# Patient Record
Sex: MALE | Race: WHITE | Employment: OTHER | URBAN - NONMETROPOLITAN AREA
[De-identification: names, ages, dates, MRNs, and addresses within clinical notes are randomized per-mention and may not be internally consistent; named-entity substitution may affect disease eponyms.]

---

## 2019-11-20 ENCOUNTER — HOSPITAL ENCOUNTER (EMERGENCY)
Age: 59
Discharge: ANOTHER HOSPITAL | End: 2019-11-20
Attending: EMERGENCY MEDICINE
Payer: COMMERCIAL

## 2019-11-20 VITALS
DIASTOLIC BLOOD PRESSURE: 72 MMHG | TEMPERATURE: 97.4 F | WEIGHT: 149 LBS | SYSTOLIC BLOOD PRESSURE: 149 MMHG | RESPIRATION RATE: 18 BRPM | OXYGEN SATURATION: 98 % | HEART RATE: 86 BPM

## 2019-11-20 DIAGNOSIS — F20.9 SCHIZOPHRENIA, UNSPECIFIED TYPE (HCC): Primary | ICD-10-CM

## 2019-11-20 LAB
ABSOLUTE EOS #: 0.35 K/UL (ref 0–0.44)
ABSOLUTE IMMATURE GRANULOCYTE: <0.03 K/UL (ref 0–0.3)
ABSOLUTE LYMPH #: 2.5 K/UL (ref 1.1–3.7)
ABSOLUTE MONO #: 0.8 K/UL (ref 0.1–1.2)
ACETAMINOPHEN LEVEL: <5 UG/ML (ref 10–30)
AMPHETAMINE SCREEN URINE: NEGATIVE
ANION GAP SERPL CALCULATED.3IONS-SCNC: 10 MMOL/L (ref 9–17)
BARBITURATE SCREEN URINE: NEGATIVE
BASOPHILS # BLD: 2 % (ref 0–2)
BASOPHILS ABSOLUTE: 0.11 K/UL (ref 0–0.2)
BENZODIAZEPINE SCREEN, URINE: NEGATIVE
BUN BLDV-MCNC: 10 MG/DL (ref 6–20)
BUN/CREAT BLD: 10 (ref 9–20)
BUPRENORPHINE URINE: NEGATIVE
CALCIUM SERPL-MCNC: 9.1 MG/DL (ref 8.6–10.4)
CANNABINOID SCREEN URINE: NEGATIVE
CHLORIDE BLD-SCNC: 99 MMOL/L (ref 98–107)
CO2: 27 MMOL/L (ref 20–31)
COCAINE METABOLITE, URINE: NEGATIVE
CREAT SERPL-MCNC: 1.01 MG/DL (ref 0.7–1.2)
DIFFERENTIAL TYPE: ABNORMAL
EKG ATRIAL RATE: 78 BPM
EKG P AXIS: 76 DEGREES
EKG P-R INTERVAL: 170 MS
EKG Q-T INTERVAL: 368 MS
EKG QRS DURATION: 96 MS
EKG QTC CALCULATION (BAZETT): 419 MS
EKG R AXIS: 87 DEGREES
EKG T AXIS: 58 DEGREES
EKG VENTRICULAR RATE: 78 BPM
EOSINOPHILS RELATIVE PERCENT: 5 % (ref 1–4)
ETHANOL PERCENT: <0.01 %
ETHANOL: <10 MG/DL
GFR AFRICAN AMERICAN: >60 ML/MIN
GFR NON-AFRICAN AMERICAN: >60 ML/MIN
GFR SERPL CREATININE-BSD FRML MDRD: ABNORMAL ML/MIN/{1.73_M2}
GFR SERPL CREATININE-BSD FRML MDRD: ABNORMAL ML/MIN/{1.73_M2}
GLUCOSE BLD-MCNC: 107 MG/DL (ref 70–99)
HCT VFR BLD CALC: 44.1 % (ref 40.7–50.3)
HEMOGLOBIN: 14.4 G/DL (ref 13–17)
IMMATURE GRANULOCYTES: 0 %
LYMPHOCYTES # BLD: 37 % (ref 24–43)
MCH RBC QN AUTO: 29.4 PG (ref 25.2–33.5)
MCHC RBC AUTO-ENTMCNC: 32.7 G/DL (ref 28.4–34.8)
MCV RBC AUTO: 90 FL (ref 82.6–102.9)
MDMA URINE: ABNORMAL
METHADONE SCREEN, URINE: NEGATIVE
METHAMPHETAMINE, URINE: NEGATIVE
MONOCYTES # BLD: 12 % (ref 3–12)
NRBC AUTOMATED: 0 PER 100 WBC
OPIATES, URINE: NEGATIVE
OXYCODONE SCREEN URINE: NEGATIVE
PDW BLD-RTO: 12.8 % (ref 11.8–14.4)
PHENCYCLIDINE, URINE: NEGATIVE
PLATELET # BLD: 327 K/UL (ref 138–453)
PLATELET ESTIMATE: ABNORMAL
PMV BLD AUTO: 9.7 FL (ref 8.1–13.5)
POTASSIUM SERPL-SCNC: 4 MMOL/L (ref 3.7–5.3)
PROPOXYPHENE, URINE: NEGATIVE
RBC # BLD: 4.9 M/UL (ref 4.21–5.77)
RBC # BLD: ABNORMAL 10*6/UL
SALICYLATE LEVEL: <1 MG/DL (ref 3–10)
SEG NEUTROPHILS: 44 % (ref 36–65)
SEGMENTED NEUTROPHILS ABSOLUTE COUNT: 3.07 K/UL (ref 1.5–8.1)
SODIUM BLD-SCNC: 136 MMOL/L (ref 135–144)
TEST INFORMATION: ABNORMAL
TRICYCLIC ANTIDEPRESSANTS, UR: POSITIVE
WBC # BLD: 6.9 K/UL (ref 3.5–11.3)
WBC # BLD: ABNORMAL 10*3/UL

## 2019-11-20 PROCEDURE — 80306 DRUG TEST PRSMV INSTRMNT: CPT

## 2019-11-20 PROCEDURE — 80307 DRUG TEST PRSMV CHEM ANLYZR: CPT

## 2019-11-20 PROCEDURE — 93010 ELECTROCARDIOGRAM REPORT: CPT | Performed by: INTERNAL MEDICINE

## 2019-11-20 PROCEDURE — 93005 ELECTROCARDIOGRAM TRACING: CPT | Performed by: EMERGENCY MEDICINE

## 2019-11-20 PROCEDURE — 99284 EMERGENCY DEPT VISIT MOD MDM: CPT

## 2019-11-20 PROCEDURE — 36415 COLL VENOUS BLD VENIPUNCTURE: CPT

## 2019-11-20 PROCEDURE — 80048 BASIC METABOLIC PNL TOTAL CA: CPT

## 2019-11-20 PROCEDURE — G0480 DRUG TEST DEF 1-7 CLASSES: HCPCS

## 2019-11-20 PROCEDURE — 85025 COMPLETE CBC W/AUTO DIFF WBC: CPT

## 2019-11-20 RX ORDER — ARIPIPRAZOLE 10 MG/1
10 TABLET ORAL DAILY
Status: ON HOLD | COMMUNITY
End: 2021-01-28 | Stop reason: ALTCHOICE

## 2019-11-20 RX ORDER — QUETIAPINE 300 MG/1
100 TABLET, FILM COATED, EXTENDED RELEASE ORAL NIGHTLY
COMMUNITY

## 2019-11-20 RX ORDER — HYDROXYZINE HYDROCHLORIDE 25 MG/1
50 TABLET, FILM COATED ORAL 2 TIMES DAILY PRN
Status: ON HOLD | COMMUNITY
End: 2021-01-28 | Stop reason: ALTCHOICE

## 2019-11-20 RX ORDER — TRAZODONE HYDROCHLORIDE 100 MG/1
100 TABLET ORAL NIGHTLY
Status: ON HOLD | COMMUNITY
End: 2021-01-28 | Stop reason: ALTCHOICE

## 2019-11-20 RX ORDER — DIPHENHYDRAMINE HCL 25 MG
50 CAPSULE ORAL NIGHTLY
Status: ON HOLD | COMMUNITY
End: 2021-01-28 | Stop reason: ALTCHOICE

## 2019-11-20 RX ORDER — DIVALPROEX SODIUM 250 MG/1
750 TABLET, EXTENDED RELEASE ORAL NIGHTLY
COMMUNITY
End: 2021-02-25

## 2019-11-20 ASSESSMENT — ENCOUNTER SYMPTOMS
BACK PAIN: 1
EYE REDNESS: 0
VOMITING: 0
NAUSEA: 0
COUGH: 1
COLOR CHANGE: 0
EYE DISCHARGE: 0
SORE THROAT: 0
SHORTNESS OF BREATH: 0
ABDOMINAL PAIN: 0

## 2019-11-21 LAB
EKG ATRIAL RATE: 78 BPM
EKG P AXIS: 77 DEGREES
EKG P-R INTERVAL: 170 MS
EKG Q-T INTERVAL: 366 MS
EKG QRS DURATION: 96 MS
EKG QTC CALCULATION (BAZETT): 417 MS
EKG R AXIS: 87 DEGREES
EKG T AXIS: 59 DEGREES
EKG VENTRICULAR RATE: 78 BPM

## 2019-11-21 PROCEDURE — 93010 ELECTROCARDIOGRAM REPORT: CPT | Performed by: INTERNAL MEDICINE

## 2020-03-09 ENCOUNTER — HOSPITAL ENCOUNTER (EMERGENCY)
Age: 60
Discharge: HOME | End: 2020-03-09
Payer: COMMERCIAL

## 2020-03-09 VITALS
HEIGHT: 66 IN | HEART RATE: 85 BPM | RESPIRATION RATE: 18 BRPM | OXYGEN SATURATION: 99 % | WEIGHT: 149 LBS | SYSTOLIC BLOOD PRESSURE: 156 MMHG | TEMPERATURE: 97.7 F | DIASTOLIC BLOOD PRESSURE: 80 MMHG | BODY MASS INDEX: 23.95 KG/M2

## 2020-03-09 LAB
-: ABNORMAL
ABSOLUTE EOS #: 0.23 K/UL (ref 0–0.44)
ABSOLUTE IMMATURE GRANULOCYTE: <0.03 K/UL (ref 0–0.3)
ABSOLUTE LYMPH #: 1.81 K/UL (ref 1.1–3.7)
ABSOLUTE MONO #: 0.73 K/UL (ref 0.1–1.2)
ACETAMINOPHEN LEVEL: <5 UG/ML (ref 10–30)
ALBUMIN SERPL-MCNC: 3.8 G/DL (ref 3.5–5.2)
ALBUMIN/GLOBULIN RATIO: 1.1 (ref 1–2.5)
ALP BLD-CCNC: 67 U/L (ref 40–129)
ALT SERPL-CCNC: 17 U/L (ref 5–41)
AMORPHOUS: ABNORMAL
AMPHETAMINE SCREEN URINE: NEGATIVE
ANION GAP SERPL CALCULATED.3IONS-SCNC: 11 MMOL/L (ref 9–17)
AST SERPL-CCNC: 23 U/L
BACTERIA: ABNORMAL
BARBITURATE SCREEN URINE: NEGATIVE
BASOPHILS # BLD: 1 % (ref 0–2)
BASOPHILS ABSOLUTE: 0.07 K/UL (ref 0–0.2)
BENZODIAZEPINE SCREEN, URINE: NEGATIVE
BILIRUB SERPL-MCNC: 0.37 MG/DL (ref 0.3–1.2)
BILIRUBIN URINE: NEGATIVE
BUN BLDV-MCNC: 18 MG/DL (ref 6–20)
BUN/CREAT BLD: 23 (ref 9–20)
BUPRENORPHINE URINE: NEGATIVE
CALCIUM SERPL-MCNC: 8.9 MG/DL (ref 8.6–10.4)
CANNABINOID SCREEN URINE: NEGATIVE
CASTS UA: ABNORMAL /LPF
CHLORIDE BLD-SCNC: 106 MMOL/L (ref 98–107)
CO2: 24 MMOL/L (ref 20–31)
COCAINE METABOLITE, URINE: NEGATIVE
COLOR: YELLOW
COMMENT UA: ABNORMAL
CREAT SERPL-MCNC: 0.77 MG/DL (ref 0.7–1.2)
CRYSTALS, UA: ABNORMAL /HPF
DIFFERENTIAL TYPE: NORMAL
EOSINOPHILS RELATIVE PERCENT: 3 % (ref 1–4)
EPITHELIAL CELLS UA: ABNORMAL /HPF (ref 0–5)
ETHANOL PERCENT: <0.01 %
ETHANOL: <10 MG/DL
GFR AFRICAN AMERICAN: >60 ML/MIN
GFR NON-AFRICAN AMERICAN: >60 ML/MIN
GFR SERPL CREATININE-BSD FRML MDRD: ABNORMAL ML/MIN/{1.73_M2}
GFR SERPL CREATININE-BSD FRML MDRD: ABNORMAL ML/MIN/{1.73_M2}
GLUCOSE BLD-MCNC: 92 MG/DL (ref 70–99)
GLUCOSE URINE: NEGATIVE
HCT VFR BLD CALC: 41.1 % (ref 40.7–50.3)
HEMOGLOBIN: 13.1 G/DL (ref 13–17)
IMMATURE GRANULOCYTES: 0 %
KETONES, URINE: NEGATIVE
LEUKOCYTE ESTERASE, URINE: NEGATIVE
LYMPHOCYTES # BLD: 25 % (ref 24–43)
MCH RBC QN AUTO: 28.9 PG (ref 25.2–33.5)
MCHC RBC AUTO-ENTMCNC: 31.9 G/DL (ref 28.4–34.8)
MCV RBC AUTO: 90.7 FL (ref 82.6–102.9)
MDMA URINE: ABNORMAL
METHADONE SCREEN, URINE: NEGATIVE
METHAMPHETAMINE, URINE: NEGATIVE
MONOCYTES # BLD: 10 % (ref 3–12)
MUCUS: ABNORMAL
NITRITE, URINE: NEGATIVE
NRBC AUTOMATED: 0 PER 100 WBC
OPIATES, URINE: NEGATIVE
OTHER OBSERVATIONS UA: ABNORMAL
OXYCODONE SCREEN URINE: NEGATIVE
PDW BLD-RTO: 13.1 % (ref 11.8–14.4)
PH UA: 6 (ref 5–9)
PHENCYCLIDINE, URINE: NEGATIVE
PLATELET # BLD: 283 K/UL (ref 138–453)
PLATELET ESTIMATE: NORMAL
PMV BLD AUTO: 10.5 FL (ref 8.1–13.5)
POTASSIUM SERPL-SCNC: 4.2 MMOL/L (ref 3.7–5.3)
PROPOXYPHENE, URINE: NEGATIVE
PROTEIN UA: NEGATIVE
RBC # BLD: 4.53 M/UL (ref 4.21–5.77)
RBC # BLD: NORMAL 10*6/UL
RBC UA: ABNORMAL /HPF (ref 0–2)
RENAL EPITHELIAL, UA: ABNORMAL /HPF
SALICYLATE LEVEL: <1 MG/DL (ref 3–10)
SEG NEUTROPHILS: 61 % (ref 36–65)
SEGMENTED NEUTROPHILS ABSOLUTE COUNT: 4.54 K/UL (ref 1.5–8.1)
SODIUM BLD-SCNC: 141 MMOL/L (ref 135–144)
SPECIFIC GRAVITY UA: >1.03 (ref 1.01–1.02)
TEST INFORMATION: ABNORMAL
TOTAL PROTEIN: 7.3 G/DL (ref 6.4–8.3)
TRICHOMONAS: ABNORMAL
TRICYCLIC ANTIDEPRESSANTS, UR: POSITIVE
TURBIDITY: CLEAR
URINE HGB: ABNORMAL
UROBILINOGEN, URINE: NORMAL
WBC # BLD: 7.4 K/UL (ref 3.5–11.3)
WBC # BLD: NORMAL 10*3/UL
WBC UA: ABNORMAL /HPF (ref 0–5)
YEAST: ABNORMAL

## 2020-03-09 PROCEDURE — 36415 COLL VENOUS BLD VENIPUNCTURE: CPT

## 2020-03-09 PROCEDURE — 80053 COMPREHEN METABOLIC PANEL: CPT

## 2020-03-09 PROCEDURE — 99284 EMERGENCY DEPT VISIT MOD MDM: CPT

## 2020-03-09 PROCEDURE — 85025 COMPLETE CBC W/AUTO DIFF WBC: CPT

## 2020-03-09 PROCEDURE — 80306 DRUG TEST PRSMV INSTRMNT: CPT

## 2020-03-09 PROCEDURE — G0480 DRUG TEST DEF 1-7 CLASSES: HCPCS

## 2020-03-09 PROCEDURE — 81001 URINALYSIS AUTO W/SCOPE: CPT

## 2020-03-09 PROCEDURE — 80307 DRUG TEST PRSMV CHEM ANLYZR: CPT

## 2020-03-09 RX ORDER — QUETIAPINE FUMARATE 25 MG/1
50 TABLET, FILM COATED ORAL 2 TIMES DAILY
Status: ON HOLD | COMMUNITY
End: 2021-01-28 | Stop reason: ALTCHOICE

## 2020-03-09 NOTE — ED NOTES
Natalia called to check in that pt was physically in the ED. Stated they are sending a therapist over to evaluate pt.      BigBad  03/09/20 2546

## 2020-03-09 NOTE — ED PROVIDER NOTES
[facility] ED  EMERGENCY DEPARTMENT ENCOUNTER      Pt Name: Dorie Forman  MRN: [id number]  [place] 1960  Date of evaluation: 3/9/2020  Provider: Thai Ross PA-C    CHIEF COMPLAINT       Chief Complaint   Patient presents with   Darrell Garcia Other     Medical clearance. Pt sent from [place] after appointment today. Nurse practitioner at [place] reports pt had a change in personality/possible Psychosis today. Denies suicidal ideation       HISTORY OF PRESENT ILLNESS    Dorie Forman is a 61 y.o. male who presents to the emergency department from [facility] where he was seen today by a nurse practitioner who had seen him once before on Friday and thought that his personality changed today and was worried about psychosis. Patient denies any suicidal ideation, he presents with family members to bring him to the emergency department he states that he is acting at his baseline and they do not see anything different about him. He does have a history of schizophrenia and so his behavior may seem odd to some people but the family states they think that he is acting at his baseline. [facility] called and stated they were sending a counselor out to meet the patient in the emergency department after provided medical clearance. Patient denies feeling ill denies chest pain shortness of breath fevers chills cough sore throat abdominal pain nausea vomiting or any other such symptoms. Triage notes and Nursing notes were reviewed by myself. Any discrepancies are addressed above. PAST MEDICAL HISTORY     Past Medical History:   Diagnosis Date    BPH (benign prostatic hyperplasia)     Schizophrenia ([facility] [address]        SURGICAL HISTORY     History reviewed. No pertinent surgical history.     CURRENT MEDICATIONS       Discharge Medication List as of 3/9/2020  7:43 PM      CONTINUE these medications which have NOT CHANGED    Details   QUEtiapine (SEROQUEL) 25 MG tablet Take 50 mg by mouth 2 times dailyHistorical Med      ARIPiprazole ER (ABILIFY MAINTENA) 400 MG PRSY Inject 400 mg into the muscle once MonthsHistorical Med      divalproex (DEPAKOTE ER) 250 MG extended release tablet Take 750 mg by mouth nightlyHistorical Med      QUEtiapine (SEROQUEL XR) 300 MG extended release tablet Take 300 mg by mouth nightlyHistorical Med      ARIPiprazole (ABILIFY) 10 MG tablet Take 10 mg by mouth dailyHistorical Med      diphenhydrAMINE (BENADRYL) 25 MG capsule Take 50 mg by mouth nightlyHistorical Med      hydrOXYzine (ATARAX) 25 MG tablet Take 50 mg by mouth 2 times daily as needed for Anxiety 1-2 twice a day for anxietyHistorical Med      traZODone (DESYREL) 100 MG tablet Take 100 mg by mouth nightlyHistorical Med             ALLERGIES     Patient has no known allergies. FAMILY HISTORY     History reviewed. No pertinent family history.      SOCIAL HISTORY       Social History     Socioeconomic History    Marital status:      Spouse name: None    Number of children: None    Years of education: None    Highest education level: None   Occupational History    None   Social Needs    Financial resource strain: None    Food insecurity     Worry: None     Inability: None    Transportation needs     Medical: None     Non-medical: None   Tobacco Use    Smoking status: Current Every Day Smoker     Packs/day: 1.50     Years: 40.00     Pack years: 60.00     Types: Cigarettes    Smokeless tobacco: Never Used   Substance and Sexual Activity    Alcohol use: Not Currently    Drug use: Not Currently    Sexual activity: None   Lifestyle    Physical activity     Days per week: None     Minutes per session: None    Stress: None   Relationships    Social connections     Talks on phone: None     Gets together: None     Attends Anabaptist service: None     Active member of club or organization: None     Attends meetings of clubs or organizations: None     Relationship status: None    Intimate partner violence

## 2020-03-09 NOTE — ED NOTES
[facility] staff who arrived with pt states that the counselor has noticed a difference in pt since his appt 4 days ago. Pt denies suicidal ideation or thoughts of hurting others. Pt is accompanied by family. [facility] staff leaving at this time.      Mary Jane Bear RN  03/09/20 8056

## 2021-01-28 ENCOUNTER — APPOINTMENT (OUTPATIENT)
Dept: GENERAL RADIOLOGY | Age: 61
DRG: 101 | End: 2021-01-28
Attending: PSYCHIATRY & NEUROLOGY
Payer: COMMERCIAL

## 2021-01-28 ENCOUNTER — APPOINTMENT (OUTPATIENT)
Dept: MRI IMAGING | Age: 61
DRG: 101 | End: 2021-01-28
Attending: PSYCHIATRY & NEUROLOGY
Payer: COMMERCIAL

## 2021-01-28 ENCOUNTER — HOSPITAL ENCOUNTER (INPATIENT)
Age: 61
LOS: 4 days | Discharge: HOME | DRG: 101 | End: 2021-02-01
Attending: PSYCHIATRY & NEUROLOGY | Admitting: PSYCHIATRY & NEUROLOGY
Payer: COMMERCIAL

## 2021-01-28 DIAGNOSIS — G93.40 ENCEPHALOPATHY: Primary | ICD-10-CM

## 2021-01-28 PROBLEM — G40.901 STATUS EPILEPTICUS (HCC): Status: ACTIVE | Noted: 2021-01-28

## 2021-01-28 LAB
-: NORMAL
ABSOLUTE EOS #: 0.16 K/UL (ref 0–0.44)
ABSOLUTE IMMATURE GRANULOCYTE: 0.03 K/UL (ref 0–0.3)
ABSOLUTE LYMPH #: 2.44 K/UL (ref 1.1–3.7)
ABSOLUTE MONO #: 0.81 K/UL (ref 0.1–1.2)
ALBUMIN SERPL-MCNC: 2.9 G/DL (ref 3.5–5.2)
ALBUMIN SERPL-MCNC: NORMAL G/DL (ref 3.5–5.2)
ALBUMIN/GLOBULIN RATIO: 0.9 (ref 1–2.5)
ALBUMIN/GLOBULIN RATIO: NORMAL (ref 1–2.5)
ALP BLD-CCNC: 66 U/L (ref 40–129)
ALP BLD-CCNC: NORMAL U/L (ref 40–129)
ALT SERPL-CCNC: 13 U/L (ref 5–41)
ALT SERPL-CCNC: NORMAL U/L (ref 5–41)
AMMONIA: 42 UMOL/L (ref 16–60)
ANION GAP SERPL CALCULATED.3IONS-SCNC: 9 MMOL/L (ref 9–17)
AST SERPL-CCNC: 29 U/L
AST SERPL-CCNC: NORMAL U/L
BASOPHILS # BLD: 1 % (ref 0–2)
BASOPHILS ABSOLUTE: 0.08 K/UL (ref 0–0.2)
BILIRUB SERPL-MCNC: 0.49 MG/DL (ref 0.3–1.2)
BILIRUB SERPL-MCNC: NORMAL MG/DL (ref 0.3–1.2)
BILIRUBIN DIRECT: 0.09 MG/DL
BILIRUBIN DIRECT: NORMAL MG/DL
BILIRUBIN, INDIRECT: 0.4 MG/DL (ref 0–1)
BILIRUBIN, INDIRECT: NORMAL MG/DL (ref 0–1)
BUN BLDV-MCNC: 16 MG/DL (ref 8–23)
BUN/CREAT BLD: ABNORMAL (ref 9–20)
CALCIUM SERPL-MCNC: 8.4 MG/DL (ref 8.6–10.4)
CHLORIDE BLD-SCNC: 115 MMOL/L (ref 98–107)
CO2: 18 MMOL/L (ref 20–31)
CREAT SERPL-MCNC: 0.8 MG/DL (ref 0.7–1.2)
DIFFERENTIAL TYPE: ABNORMAL
EOSINOPHILS RELATIVE PERCENT: 2 % (ref 1–4)
GFR AFRICAN AMERICAN: >60 ML/MIN
GFR NON-AFRICAN AMERICAN: >60 ML/MIN
GFR SERPL CREATININE-BSD FRML MDRD: ABNORMAL ML/MIN/{1.73_M2}
GFR SERPL CREATININE-BSD FRML MDRD: ABNORMAL ML/MIN/{1.73_M2}
GLOBULIN: ABNORMAL G/DL (ref 1.5–3.8)
GLOBULIN: NORMAL G/DL (ref 1.5–3.8)
GLUCOSE BLD-MCNC: 75 MG/DL (ref 70–99)
HCT VFR BLD CALC: 43.6 % (ref 40.7–50.3)
HEMOGLOBIN: 12.6 G/DL (ref 13–17)
IMMATURE GRANULOCYTES: 0 %
INR BLD: 1
LYMPHOCYTES # BLD: 23 % (ref 24–43)
MAGNESIUM: 2.5 MG/DL (ref 1.6–2.6)
MCH RBC QN AUTO: 28.4 PG (ref 25.2–33.5)
MCHC RBC AUTO-ENTMCNC: 28.9 G/DL (ref 28.4–34.8)
MCV RBC AUTO: 98.4 FL (ref 82.6–102.9)
MONOCYTES # BLD: 8 % (ref 3–12)
NRBC AUTOMATED: 0 PER 100 WBC
PARTIAL THROMBOPLASTIN TIME: 23.7 SEC (ref 20.5–30.5)
PDW BLD-RTO: 13.8 % (ref 11.8–14.4)
PLATELET # BLD: 164 K/UL (ref 138–453)
PLATELET ESTIMATE: ABNORMAL
PMV BLD AUTO: 11.2 FL (ref 8.1–13.5)
POTASSIUM SERPL-SCNC: 4.2 MMOL/L (ref 3.7–5.3)
PROCALCITONIN: 0.15 NG/ML
PROTHROMBIN TIME: 11 SEC (ref 9–12)
RBC # BLD: 4.43 M/UL (ref 4.21–5.77)
RBC # BLD: ABNORMAL 10*6/UL
REASON FOR REJECTION: NORMAL
SEG NEUTROPHILS: 66 % (ref 36–65)
SEGMENTED NEUTROPHILS ABSOLUTE COUNT: 7.07 K/UL (ref 1.5–8.1)
SODIUM BLD-SCNC: 142 MMOL/L (ref 135–144)
TOTAL PROTEIN: 6 G/DL (ref 6.4–8.3)
TOTAL PROTEIN: NORMAL G/DL (ref 6.4–8.3)
VALPROIC ACID % FREE: 19 % (ref 5–18.4)
VALPROIC ACID LEVEL: 72 UG/ML (ref 50–125)
VALPROIC ACID LEVEL: 85 UG/ML (ref 50–125)
VALPROIC ACID, FREE: 13.7 UG/ML (ref 7–23)
VALPROIC DATE LAST DOSE: ABNORMAL
VALPROIC DATE LAST DOSE: NORMAL
VALPROIC DOSE AMOUNT: ABNORMAL
VALPROIC DOSE AMOUNT: NORMAL
VALPROIC TIME LAST DOSE: ABNORMAL
VALPROIC TIME LAST DOSE: NORMAL
WBC # BLD: 10.6 K/UL (ref 3.5–11.3)
WBC # BLD: ABNORMAL 10*3/UL
ZZ NTE CLEAN UP: ORDERED TEST: NORMAL
ZZ NTE WITH NAME CLEAN UP: SPECIMEN SOURCE: NORMAL

## 2021-01-28 PROCEDURE — 87086 URINE CULTURE/COLONY COUNT: CPT

## 2021-01-28 PROCEDURE — 70553 MRI BRAIN STEM W/O & W/DYE: CPT

## 2021-01-28 PROCEDURE — 80164 ASSAY DIPROPYLACETIC ACD TOT: CPT

## 2021-01-28 PROCEDURE — 95700 EEG CONT REC W/VID EEG TECH: CPT

## 2021-01-28 PROCEDURE — 51798 US URINE CAPACITY MEASURE: CPT

## 2021-01-28 PROCEDURE — 82140 ASSAY OF AMMONIA: CPT

## 2021-01-28 PROCEDURE — 2580000003 HC RX 258: Performed by: STUDENT IN AN ORGANIZED HEALTH CARE EDUCATION/TRAINING PROGRAM

## 2021-01-28 PROCEDURE — 71045 X-RAY EXAM CHEST 1 VIEW: CPT

## 2021-01-28 PROCEDURE — 85610 PROTHROMBIN TIME: CPT

## 2021-01-28 PROCEDURE — 85025 COMPLETE CBC W/AUTO DIFF WBC: CPT

## 2021-01-28 PROCEDURE — 94002 VENT MGMT INPAT INIT DAY: CPT

## 2021-01-28 PROCEDURE — 2000000003 HC NEURO ICU R&B

## 2021-01-28 PROCEDURE — 99291 CRITICAL CARE FIRST HOUR: CPT | Performed by: PSYCHIATRY & NEUROLOGY

## 2021-01-28 PROCEDURE — 80076 HEPATIC FUNCTION PANEL: CPT

## 2021-01-28 PROCEDURE — 80165 DIPROPYLACETIC ACID FREE: CPT

## 2021-01-28 PROCEDURE — 5A1945Z RESPIRATORY VENTILATION, 24-96 CONSECUTIVE HOURS: ICD-10-PCS | Performed by: PSYCHIATRY & NEUROLOGY

## 2021-01-28 PROCEDURE — A9576 INJ PROHANCE MULTIPACK: HCPCS | Performed by: PSYCHIATRY & NEUROLOGY

## 2021-01-28 PROCEDURE — 84145 PROCALCITONIN (PCT): CPT

## 2021-01-28 PROCEDURE — 6370000000 HC RX 637 (ALT 250 FOR IP): Performed by: STUDENT IN AN ORGANIZED HEALTH CARE EDUCATION/TRAINING PROGRAM

## 2021-01-28 PROCEDURE — 83735 ASSAY OF MAGNESIUM: CPT

## 2021-01-28 PROCEDURE — 81001 URINALYSIS AUTO W/SCOPE: CPT

## 2021-01-28 PROCEDURE — 6370000000 HC RX 637 (ALT 250 FOR IP): Performed by: NURSE PRACTITIONER

## 2021-01-28 PROCEDURE — 36415 COLL VENOUS BLD VENIPUNCTURE: CPT

## 2021-01-28 PROCEDURE — 6360000002 HC RX W HCPCS: Performed by: NURSE PRACTITIONER

## 2021-01-28 PROCEDURE — 80048 BASIC METABOLIC PNL TOTAL CA: CPT

## 2021-01-28 PROCEDURE — 80307 DRUG TEST PRSMV CHEM ANLYZR: CPT

## 2021-01-28 PROCEDURE — 95714 VEEG EA 12-26 HR UNMNTR: CPT

## 2021-01-28 PROCEDURE — 85730 THROMBOPLASTIN TIME PARTIAL: CPT

## 2021-01-28 PROCEDURE — 6360000004 HC RX CONTRAST MEDICATION: Performed by: PSYCHIATRY & NEUROLOGY

## 2021-01-28 PROCEDURE — 94003 VENT MGMT INPAT SUBQ DAY: CPT

## 2021-01-28 RX ORDER — VALPROIC ACID 250 MG/5ML
500 SOLUTION ORAL EVERY 12 HOURS SCHEDULED
Status: DISCONTINUED | OUTPATIENT
Start: 2021-01-28 | End: 2021-01-31

## 2021-01-28 RX ORDER — PROMETHAZINE HYDROCHLORIDE 12.5 MG/1
12.5 TABLET ORAL EVERY 6 HOURS PRN
Status: DISCONTINUED | OUTPATIENT
Start: 2021-01-28 | End: 2021-02-01 | Stop reason: HOSPADM

## 2021-01-28 RX ORDER — ONDANSETRON 2 MG/ML
4 INJECTION INTRAMUSCULAR; INTRAVENOUS EVERY 6 HOURS PRN
Status: DISCONTINUED | OUTPATIENT
Start: 2021-01-28 | End: 2021-02-01 | Stop reason: HOSPADM

## 2021-01-28 RX ORDER — FAMOTIDINE 20 MG/1
20 TABLET, FILM COATED ORAL 2 TIMES DAILY
Status: DISCONTINUED | OUTPATIENT
Start: 2021-01-29 | End: 2021-01-30

## 2021-01-28 RX ORDER — 0.9 % SODIUM CHLORIDE 0.9 %
1000 INTRAVENOUS SOLUTION INTRAVENOUS ONCE
Status: COMPLETED | OUTPATIENT
Start: 2021-01-28 | End: 2021-01-28

## 2021-01-28 RX ORDER — SODIUM CHLORIDE 0.9 % (FLUSH) 0.9 %
10 SYRINGE (ML) INJECTION PRN
Status: DISCONTINUED | OUTPATIENT
Start: 2021-01-28 | End: 2021-02-01 | Stop reason: HOSPADM

## 2021-01-28 RX ORDER — DIVALPROEX SODIUM 500 MG/1
500 TABLET, EXTENDED RELEASE ORAL 2 TIMES DAILY
Status: DISCONTINUED | OUTPATIENT
Start: 2021-01-28 | End: 2021-01-28

## 2021-01-28 RX ORDER — PROPOFOL 10 MG/ML
10 INJECTION, EMULSION INTRAVENOUS
Status: DISCONTINUED | OUTPATIENT
Start: 2021-01-28 | End: 2021-01-29

## 2021-01-28 RX ORDER — SODIUM CHLORIDE 9 MG/ML
INJECTION, SOLUTION INTRAVENOUS CONTINUOUS
Status: DISCONTINUED | OUTPATIENT
Start: 2021-01-28 | End: 2021-01-29

## 2021-01-28 RX ORDER — SODIUM CHLORIDE 0.9 % (FLUSH) 0.9 %
10 SYRINGE (ML) INJECTION EVERY 12 HOURS SCHEDULED
Status: DISCONTINUED | OUTPATIENT
Start: 2021-01-28 | End: 2021-02-01 | Stop reason: HOSPADM

## 2021-01-28 RX ORDER — ACETAMINOPHEN 325 MG/1
650 TABLET ORAL EVERY 6 HOURS PRN
Status: DISCONTINUED | OUTPATIENT
Start: 2021-01-28 | End: 2021-02-01 | Stop reason: HOSPADM

## 2021-01-28 RX ORDER — ARIPIPRAZOLE 10 MG/1
10 TABLET ORAL DAILY
Status: DISCONTINUED | OUTPATIENT
Start: 2021-01-28 | End: 2021-02-01 | Stop reason: HOSPADM

## 2021-01-28 RX ORDER — QUETIAPINE FUMARATE 25 MG/1
50 TABLET, FILM COATED ORAL 2 TIMES DAILY
Status: DISCONTINUED | OUTPATIENT
Start: 2021-01-28 | End: 2021-01-29

## 2021-01-28 RX ORDER — ACETAMINOPHEN 650 MG/1
650 SUPPOSITORY RECTAL EVERY 6 HOURS PRN
Status: DISCONTINUED | OUTPATIENT
Start: 2021-01-28 | End: 2021-02-01 | Stop reason: HOSPADM

## 2021-01-28 RX ORDER — POLYETHYLENE GLYCOL 3350 17 G/17G
17 POWDER, FOR SOLUTION ORAL DAILY PRN
Status: DISCONTINUED | OUTPATIENT
Start: 2021-01-28 | End: 2021-02-01 | Stop reason: HOSPADM

## 2021-01-28 RX ADMIN — SODIUM CHLORIDE, PRESERVATIVE FREE 10 ML: 5 INJECTION INTRAVENOUS at 20:48

## 2021-01-28 RX ADMIN — GADOTERIDOL 14 ML: 279.3 INJECTION, SOLUTION INTRAVENOUS at 16:51

## 2021-01-28 RX ADMIN — PROPOFOL 7.5 MCG/KG/MIN: 10 INJECTION, EMULSION INTRAVENOUS at 16:00

## 2021-01-28 RX ADMIN — VALPROIC ACID 500 MG: 250 SOLUTION ORAL at 11:18

## 2021-01-28 RX ADMIN — SODIUM CHLORIDE: 9 INJECTION, SOLUTION INTRAVENOUS at 08:15

## 2021-01-28 RX ADMIN — PROPOFOL 10 MCG/KG/MIN: 10 INJECTION, EMULSION INTRAVENOUS at 09:45

## 2021-01-28 RX ADMIN — VALPROIC ACID 500 MG: 250 SOLUTION ORAL at 20:48

## 2021-01-28 RX ADMIN — SODIUM CHLORIDE 1000 ML: 9 INJECTION, SOLUTION INTRAVENOUS at 06:46

## 2021-01-28 RX ADMIN — QUETIAPINE FUMARATE 50 MG: 25 TABLET ORAL at 20:48

## 2021-01-28 ASSESSMENT — PULMONARY FUNCTION TESTS
PIF_VALUE: 21
PIF_VALUE: 41
PIF_VALUE: 24
PIF_VALUE: 26
PIF_VALUE: 22

## 2021-01-28 NOTE — H&P
Neuro ICU History & Physical    Patient Name: Andrew Roblero  Patient : 1960  Room/Bed: [facility]05-  Code Status: Full  Allergies: No Known Allergies    CHIEF COMPLAINT     Seizures    HPI    History Obtained From: EMR    The patient is a 61 y.o. male with history of schizoaffective disorder on Depakote, presented with seizures. Patient was found unresponsive by wife, called EMS, found to have generalized tonic-clonic seizures outlying facility, intubated, sent to [facility] for further evaluation. Patient has no history of seizures, is on Depakote for schizoaffective disorder. Patient was found to be positive for cocaine on urine drug screen. CT head was unremarkable. Patient was loaded with Keppra, Depakote. There was also concern for pneumonia, patient was started on vancomycin and Zosyn. On exam, patient is intubated and sedated, withdrawing to pain    Admitted to ICU From: [facility]  Reason for ICU Admission: Seizures       PATIENT HISTORY   Past Medical History:        Diagnosis Date    BPH (benign prostatic hyperplasia)     Schizophrenia ([facility] [address]        Past Surgical History:    No past surgical history on file.     Social History:   Social History     Socioeconomic History    Marital status:      Spouse name: Not on file    Number of children: Not on file    Years of education: Not on file    Highest education level: Not on file   Occupational History    Not on file   Social Needs    Financial resource strain: Not on file    Food insecurity     Worry: Not on file     Inability: Not on file   [employer] needs     Medical: Not on file     Non-medical: Not on file   Tobacco Use    Smoking status: Current Every Day Smoker     Packs/day: 1.50     Years: 40.00     Pack years: 60.00     Types: Cigarettes    Smokeless tobacco: Never Used   Substance and Sexual Activity    Alcohol use: Not Currently    Drug use: Not Currently    Sexual activity: Not on file   Lifestyle  Physical activity     Days per week: Not on file     Minutes per session: Not on file    Stress: Not on file   Relationships    Social connections     Talks on phone: Not on file     Gets together: Not on file     Attends Pentecostalism service: Not on file     Active member of club or organization: Not on file     Attends meetings of clubs or organizations: Not on file     Relationship status: Not on file    Intimate partner violence     Fear of current or ex partner: Not on file     Emotionally abused: Not on file     Physically abused: Not on file     Forced sexual activity: Not on file   Other Topics Concern    Not on file   Social History Narrative    Not on file       Family History:   No family history on file. Allergies:    Patient has no known allergies. Medications Prior to Admission:    Medications Prior to Admission: QUEtiapine (SEROQUEL) 25 MG tablet, Take 50 mg by mouth 2 times daily  ARIPiprazole (ABILIFY) 10 MG tablet, Take 10 mg by mouth daily  ARIPiprazole ER (ABILIFY MAINTENA) 400 MG PRSY, Inject 400 mg into the muscle once Months  divalproex (DEPAKOTE ER) 250 MG extended release tablet, Take 750 mg by mouth nightly  diphenhydrAMINE (BENADRYL) 25 MG capsule, Take 50 mg by mouth nightly  hydrOXYzine (ATARAX) 25 MG tablet, Take 50 mg by mouth 2 times daily as needed for Anxiety 1-2 twice a day for anxiety  QUEtiapine (SEROQUEL XR) 300 MG extended release tablet, Take 300 mg by mouth nightly  traZODone (DESYREL) 100 MG tablet, Take 100 mg by mouth nightly    Current Medications:  No current facility-administered medications for this encounter.      REVIEW OF SYSTEMS     Unable to obtain secondary to condition    PHYSICAL EXAM:     Ht 5' 6\" (1.676 m)   Wt 159 lb 9.8 oz (72.4 kg)   BMI 25.76 kg/m²     PHYSICAL EXAM:  CONSTITUTIONAL:  Patient is intubated and sedated   HEAD:  normocephalic, atraumatic    EYES:  PERRLA, EOMI.   ENT:  moist mucous membranes   LUNGS:  Equal air entry bilaterally CARDIOVASCULAR:  normal s1 / s2   ABDOMEN:  Soft, no rigidity   NECK supple, symmetric, no midline tenderness to palpation    BACK without midline tenderness, step-offs or deformities    EXTREMITIES Normal ROM with no deformities   NEUROLOGIC:  Mental Status: Intubated and sedated             Cranial Nerves:    Pupils 3 mm and equally reactive bilaterally  Cough reflex intact    Motor Exam:    Withdraws to pain in all extremities     SKIN No obvious ecchymosis, rashes, or lesions          LABS AND IMAGING:     RECENT LABS:  CBC with Differential:    Lab Results   Component Value Date    WBC 7.4 03/09/2020    RBC 4.53 03/09/2020    HGB 13.1 03/09/2020    HCT 41.1 03/09/2020     03/09/2020    MCV 90.7 03/09/2020    MCH 28.9 03/09/2020    MCHC 31.9 03/09/2020    RDW 13.1 03/09/2020    LYMPHOPCT 25 03/09/2020    MONOPCT 10 03/09/2020    BASOPCT 1 03/09/2020    MONOSABS 0.73 03/09/2020    LYMPHSABS 1.81 03/09/2020    EOSABS 0.23 03/09/2020    BASOSABS 0.07 03/09/2020    DIFFTYPE NOT REPORTED 03/09/2020     BMP:    Lab Results   Component Value Date     03/09/2020    K 4.2 03/09/2020     03/09/2020    CO2 24 03/09/2020    BUN 18 03/09/2020    LABALBU 3.8 03/09/2020    CREATININE 0.77 03/09/2020    CALCIUM 8.9 03/09/2020    GFRAA >60 03/09/2020    LABGLOM >60 03/09/2020    GLUCOSE 92 03/09/2020       RADIOLOGY:     No results found. Labs and Images reviewed with:    [] Richie Suarez MD    [x] Chet Gomez MD  [] Swapnil Sparks MD  --[] there are no new interval images to review. ASSESSMENT AND PLAN:         ASSESSMENT:     This is a 61 y.o. male with history of schizoaffective disorder on Depakote, presented for first-time seizure, intubated and sedated on propofol. Patient care will be discussed with attending, will reevaluate patient along with attending.      PLAN/MEDICAL DECISION MAKING:      NEUROLOGIC:  - Imaging CT head at outlying facility found no acute intracranial process, was significant for mild old microvascular ischemic change and age-related cerebral atrophy  - AEDs loaded with Keppra and Depakote   Continue Depakote 500 mg twice daily  - LP this morning  - Goal SBP normotensive  - Continue home psychiatric medications  - Neuro checks per protocol    CARDIOVASCULAR:  - Goal SBP normotensive  - Continue telemetry    PULMONARY:  - Vent Settings: 16/530/40 percent/5 PEEP  - Daily ABG: Pending  - Wean from sedation and extubate    RENAL/FLUID/ELECTROLYTE:  - Labs pending  - Monitor urine output  - IVF: 100 cc/h normal saline  - Replace electrolytes PRN  - Daily BMP    GI/NUTRITION:  NUTRITION:  No diet orders on file  - Bowel regimen: GlycoLax as needed  - GI prophylaxis: Pepcid twice daily    ID:  - Afebrile  - Continue to monitor for fevers  - Daily CBC    HEME:   - Labs pending  - Daily CBC    ENDOCRINE:  - Continue to monitor blood glucose, goal <180    OTHER:  - PT/OT/ST     PROPHYLAXIS:  Stress ulcer: H2 blocker    DVT PROPHYLAXIS:  - SCD sleeves - Thigh High   - ANY stockings - Thigh High  -Lovenox      DISPOSITION: Admit to ICU      Kelly Ernst MD  Neuro Critical Care Service   Pager [phone]  1/28/2021     5:53 AM

## 2021-01-28 NOTE — PLAN OF CARE
Pt vented and sedated. NS and propofol infusing. Restraints in place. MRI done. Continuous EEG. Urology to be consulted. No additional needs at this time, will continue to monitor. Wife Roberto Lerner at the bedside and updated.     Problem: Restraint Use - Nonviolent/Non-Self-Destructive Behavior:  Goal: Absence of restraint indications  Description: Absence of restraint indications  1/28/2021 1842 by Darin Hylton RN  Outcome: Ongoing  1/28/2021 0702 by Gretchen Alexandra RN  Outcome: Not Met This Shift  Goal: Absence of restraint-related injury  Description: Absence of restraint-related injury  1/28/2021 1842 by Darin Hylton RN  Outcome: Ongoing  1/28/2021 0702 by Gretchen Alexandra RN  Outcome: Met This Shift     Problem: OXYGENATION/RESPIRATORY FUNCTION  Goal: Patient will achieve/maintain normal respiratory rate/effort  Description: Respiratory rate and effort will be within normal limits for the patient  1/28/2021 1842 by Darin Hylton RN  Outcome: Ongoing  1/28/2021 1644 by Nemesio Mcqueen RCP  Outcome: Ongoing     Problem: MECHANICAL VENTILATION  Goal: Patient will maintain patent airway  1/28/2021 1842 by Darin Hylton RN  Outcome: Ongoing  1/28/2021 1644 by Nemesio Mcqueen RCP  Outcome: Ongoing  Goal: Oral health is maintained or improved  1/28/2021 1842 by Darin Hylton RN  Outcome: Ongoing  1/28/2021 1644 by Nemesio Mcqueen RCP  Outcome: Ongoing  Goal: ET tube will be managed safely  1/28/2021 1842 by Darin Hylton RN  Outcome: Ongoing  1/28/2021 1644 by Nemesio Mcqueen RCP  Outcome: Ongoing  Goal: Ability to express needs and understand communication  1/28/2021 1842 by Darin Hylton RN  Outcome: Ongoing  1/28/2021 1644 by Nemesio Mcqueen RCP  Outcome: Ongoing  Goal: Mobility/activity is maintained at optimum level for patient  1/28/2021 1842 by Darin Hylton RN  Outcome: Ongoing  1/28/2021 1644 by Nemesio Mcqueen RCP  Outcome: Ongoing

## 2021-01-29 LAB
-: ABNORMAL
ABSOLUTE EOS #: 0.35 K/UL (ref 0–0.44)
ABSOLUTE IMMATURE GRANULOCYTE: <0.03 K/UL (ref 0–0.3)
ABSOLUTE LYMPH #: 1.68 K/UL (ref 1.1–3.7)
ABSOLUTE MONO #: 0.53 K/UL (ref 0.1–1.2)
ALBUMIN CSF: 839 MG/L (ref 70–350)
ALBUMIN INDEX: 262.2
ALBUMIN SERPL-MCNC: 3.2 G/DL (ref 3.5–5.2)
ALLEN TEST: ABNORMAL
AMORPHOUS: ABNORMAL
AMPHETAMINE SCREEN URINE: NEGATIVE
ANION GAP SERPL CALCULATED.3IONS-SCNC: 7 MMOL/L (ref 9–17)
APPEARANCE CSF: CLEAR
BACTERIA: ABNORMAL
BARBITURATE SCREEN URINE: NEGATIVE
BASOPHILS # BLD: 1 % (ref 0–2)
BASOPHILS ABSOLUTE: 0.06 K/UL (ref 0–0.2)
BENZODIAZEPINE SCREEN, URINE: POSITIVE
BILIRUBIN URINE: ABNORMAL
BUN BLDV-MCNC: 18 MG/DL (ref 8–23)
BUN/CREAT BLD: ABNORMAL (ref 9–20)
BUPRENORPHINE URINE: ABNORMAL
CALCIUM SERPL-MCNC: 8.1 MG/DL (ref 8.6–10.4)
CANNABINOID SCREEN URINE: NEGATIVE
CASTS UA: ABNORMAL /LPF (ref 0–2)
CHLORIDE BLD-SCNC: 114 MMOL/L (ref 98–107)
CO2: 21 MMOL/L (ref 20–31)
COCAINE METABOLITE, URINE: NEGATIVE
COLOR: ABNORMAL
CREAT SERPL-MCNC: 0.77 MG/DL (ref 0.7–1.2)
CRYPTOCOCCUS NEOFORMANS/GATTI CSF FILM ARR.: NOT DETECTED
CRYSTALS, UA: ABNORMAL /HPF
CULTURE: NO GROWTH
CYTOMEGALOVIRUS (CMV) CSF FILM ARRAY: NOT DETECTED
DIFFERENTIAL TYPE: ABNORMAL
DIRECT EXAM: NORMAL
ENTEROVIRUS CSF FILM ARRAY: NOT DETECTED
EOSINOPHILS RELATIVE PERCENT: 5 % (ref 1–4)
EPITHELIAL CELLS UA: ABNORMAL /HPF (ref 0–5)
ESCHERICHIA COLI K1 CSF FILM ARRAY: NOT DETECTED
FIO2: 40
GFR AFRICAN AMERICAN: >60 ML/MIN
GFR NON-AFRICAN AMERICAN: >60 ML/MIN
GFR SERPL CREATININE-BSD FRML MDRD: ABNORMAL ML/MIN/{1.73_M2}
GFR SERPL CREATININE-BSD FRML MDRD: ABNORMAL ML/MIN/{1.73_M2}
GLUCOSE BLD-MCNC: 84 MG/DL (ref 74–100)
GLUCOSE BLD-MCNC: 85 MG/DL (ref 70–99)
GLUCOSE URINE: ABNORMAL
GLUCOSE, CSF: 49 MG/DL (ref 40–70)
HAEMOPHILUS INFLUENZA CSF FILM ARRAY: NOT DETECTED
HCT VFR BLD CALC: 37.5 % (ref 40.7–50.3)
HEMOGLOBIN: 11.5 G/DL (ref 13–17)
HHV-6 (HERPESVIRUS 6) CSF FILM ARRAY: NOT DETECTED
HSV-1 CSF FILM ARRAY: NOT DETECTED
HSV-2 CSF FILM ARRAY: NOT DETECTED
IGG CSF: 22.9 MG/DL (ref 0.5–7)
IGG INDEX CSF: 0.7
IGG SYNTHESIS RATE CSF: 39.3 MG/24 H
IGG: 1242 MG/DL (ref 700–1600)
IMMATURE GRANULOCYTES: 0 %
KETONES, URINE: ABNORMAL
LEUKOCYTE ESTERASE, URINE: ABNORMAL
LISTERIA MONOCYTOGENES CSF FILM ARRAY: NOT DETECTED
LYMPHOCYTES # BLD: 23 % (ref 24–43)
Lab: NORMAL
Lab: NORMAL
MAGNESIUM: 1.9 MG/DL (ref 1.6–2.6)
MCH RBC QN AUTO: 28.2 PG (ref 25.2–33.5)
MCHC RBC AUTO-ENTMCNC: 30.7 G/DL (ref 28.4–34.8)
MCV RBC AUTO: 91.9 FL (ref 82.6–102.9)
MDMA URINE: ABNORMAL
METHADONE SCREEN, URINE: NEGATIVE
METHAMPHETAMINE, URINE: ABNORMAL
MODE: ABNORMAL
MONOCYTES # BLD: 7 % (ref 3–12)
MUCUS: ABNORMAL
NEGATIVE BASE EXCESS, ART: 1 (ref 0–2)
NEISSERIA MENIGITIDIS CSF FILM ARRAY: NOT DETECTED
NITRITE, URINE: POSITIVE
NRBC AUTOMATED: 0 PER 100 WBC
O2 DEVICE/FLOW/%: ABNORMAL
OLIGOCLONAL BANDS: ABNORMAL
OPIATES, URINE: NEGATIVE
OTHER OBSERVATIONS UA: ABNORMAL
OXYCODONE SCREEN URINE: NEGATIVE
PARECHOVIRUS CSF FILM ARRAY: NOT DETECTED
PATIENT TEMP: ABNORMAL
PDW BLD-RTO: 13.8 % (ref 11.8–14.4)
PH UA: 6.5 (ref 5–8)
PHENCYCLIDINE, URINE: NEGATIVE
PLATELET # BLD: 206 K/UL (ref 138–453)
PLATELET ESTIMATE: ABNORMAL
PMV BLD AUTO: 10.9 FL (ref 8.1–13.5)
POC HCO3: 24.5 MMOL/L (ref 21–28)
POC LACTIC ACID: 0.53 MMOL/L (ref 0.56–1.39)
POC O2 SATURATION: 99 % (ref 94–98)
POC PCO2 TEMP: ABNORMAL MM HG
POC PCO2: 45.1 MM HG (ref 35–48)
POC PH TEMP: ABNORMAL
POC PH: 7.34 (ref 7.35–7.45)
POC PO2 TEMP: ABNORMAL MM HG
POC PO2: 142 MM HG (ref 83–108)
POSITIVE BASE EXCESS, ART: ABNORMAL (ref 0–3)
POTASSIUM SERPL-SCNC: 3.8 MMOL/L (ref 3.7–5.3)
PROPOXYPHENE, URINE: ABNORMAL
PROTEIN CSF: 138.8 MG/DL (ref 15–45)
PROTEIN UA: ABNORMAL
RBC # BLD: 4.08 M/UL (ref 4.21–5.77)
RBC # BLD: ABNORMAL 10*6/UL
RBC CSF: 0 /MM3
RBC UA: ABNORMAL /HPF (ref 0–2)
RENAL EPITHELIAL, UA: ABNORMAL /HPF
SAMPLE SITE: ABNORMAL
SEG NEUTROPHILS: 64 % (ref 36–65)
SEGMENTED NEUTROPHILS ABSOLUTE COUNT: 4.59 K/UL (ref 1.5–8.1)
SODIUM BLD-SCNC: 142 MMOL/L (ref 135–144)
SPECIFIC GRAVITY UA: 1.02 (ref 1–1.03)
SPECIMEN DESCRIPTION: NORMAL
STREPTOCOCCUS AGALACTIAE CSF FILM ARRAY: NOT DETECTED
STREPTOCOCCUS PNEUMONIAE CSF FILM ARRAY: NOT DETECTED
SUPERNAT COLOR CSF: NORMAL
TCO2 (CALC), ART: 26 MMOL/L (ref 22–29)
TEST INFORMATION: ABNORMAL
TRICHOMONAS: ABNORMAL
TRICYCLIC ANTIDEPRESSANTS, UR: ABNORMAL
TUBE NUMBER CSF: 3
TURBIDITY: ABNORMAL
URINE HGB: ABNORMAL
UROBILINOGEN, URINE: ABNORMAL
VARICELLA-ZOSTER CSF FILM ARRAY: NOT DETECTED
VOLUME CSF: 18
WBC # BLD: 7.2 K/UL (ref 3.5–11.3)
WBC # BLD: ABNORMAL 10*3/UL
WBC CSF: 0 /MM3
WBC UA: ABNORMAL /HPF (ref 0–5)
XANTHOCHROMIA: NORMAL
YEAST: ABNORMAL

## 2021-01-29 PROCEDURE — 83605 ASSAY OF LACTIC ACID: CPT

## 2021-01-29 PROCEDURE — 95711 VEEG 2-12 HR UNMONITORED: CPT

## 2021-01-29 PROCEDURE — 87070 CULTURE OTHR SPECIMN AEROBIC: CPT

## 2021-01-29 PROCEDURE — 82042 OTHER SOURCE ALBUMIN QUAN EA: CPT

## 2021-01-29 PROCEDURE — 82803 BLOOD GASES ANY COMBINATION: CPT

## 2021-01-29 PROCEDURE — 36415 COLL VENOUS BLD VENIPUNCTURE: CPT

## 2021-01-29 PROCEDURE — 87015 SPECIMEN INFECT AGNT CONCNTJ: CPT

## 2021-01-29 PROCEDURE — 87483 CNS DNA AMP PROBE TYPE 12-25: CPT

## 2021-01-29 PROCEDURE — 86618 LYME DISEASE ANTIBODY: CPT

## 2021-01-29 PROCEDURE — 6360000002 HC RX W HCPCS: Performed by: PSYCHIATRY & NEUROLOGY

## 2021-01-29 PROCEDURE — 87529 HSV DNA AMP PROBE: CPT

## 2021-01-29 PROCEDURE — 95720 EEG PHY/QHP EA INCR W/VEEG: CPT | Performed by: PSYCHIATRY & NEUROLOGY

## 2021-01-29 PROCEDURE — 89050 BODY FLUID CELL COUNT: CPT

## 2021-01-29 PROCEDURE — 2000000003 HC NEURO ICU R&B

## 2021-01-29 PROCEDURE — 6370000000 HC RX 637 (ALT 250 FOR IP): Performed by: STUDENT IN AN ORGANIZED HEALTH CARE EDUCATION/TRAINING PROGRAM

## 2021-01-29 PROCEDURE — 99291 CRITICAL CARE FIRST HOUR: CPT | Performed by: PSYCHIATRY & NEUROLOGY

## 2021-01-29 PROCEDURE — 2580000003 HC RX 258: Performed by: STUDENT IN AN ORGANIZED HEALTH CARE EDUCATION/TRAINING PROGRAM

## 2021-01-29 PROCEDURE — 87040 BLOOD CULTURE FOR BACTERIA: CPT

## 2021-01-29 PROCEDURE — 82945 GLUCOSE OTHER FLUID: CPT

## 2021-01-29 PROCEDURE — 85025 COMPLETE CBC W/AUTO DIFF WBC: CPT

## 2021-01-29 PROCEDURE — 82784 ASSAY IGA/IGD/IGG/IGM EACH: CPT

## 2021-01-29 PROCEDURE — 6370000000 HC RX 637 (ALT 250 FOR IP): Performed by: NURSE PRACTITIONER

## 2021-01-29 PROCEDURE — 6360000002 HC RX W HCPCS

## 2021-01-29 PROCEDURE — 94003 VENT MGMT INPAT SUBQ DAY: CPT

## 2021-01-29 PROCEDURE — 009U3ZX DRAINAGE OF SPINAL CANAL, PERCUTANEOUS APPROACH, DIAGNOSTIC: ICD-10-PCS | Performed by: PSYCHIATRY & NEUROLOGY

## 2021-01-29 PROCEDURE — 80048 BASIC METABOLIC PNL TOTAL CA: CPT

## 2021-01-29 PROCEDURE — 82040 ASSAY OF SERUM ALBUMIN: CPT

## 2021-01-29 PROCEDURE — 36600 WITHDRAWAL OF ARTERIAL BLOOD: CPT

## 2021-01-29 PROCEDURE — 84157 ASSAY OF PROTEIN OTHER: CPT

## 2021-01-29 PROCEDURE — 83916 OLIGOCLONAL BANDS: CPT

## 2021-01-29 PROCEDURE — 2580000003 HC RX 258: Performed by: PSYCHIATRY & NEUROLOGY

## 2021-01-29 PROCEDURE — 86592 SYPHILIS TEST NON-TREP QUAL: CPT

## 2021-01-29 PROCEDURE — 87205 SMEAR GRAM STAIN: CPT

## 2021-01-29 PROCEDURE — 82947 ASSAY GLUCOSE BLOOD QUANT: CPT

## 2021-01-29 PROCEDURE — 83735 ASSAY OF MAGNESIUM: CPT

## 2021-01-29 PROCEDURE — 51702 INSERT TEMP BLADDER CATH: CPT

## 2021-01-29 RX ORDER — SODIUM CHLORIDE 0.9 % (FLUSH) 0.9 %
10 SYRINGE (ML) INJECTION PRN
Status: DISCONTINUED | OUTPATIENT
Start: 2021-01-29 | End: 2021-01-29 | Stop reason: SDUPTHER

## 2021-01-29 RX ORDER — SENNA AND DOCUSATE SODIUM 50; 8.6 MG/1; MG/1
2 TABLET, FILM COATED ORAL DAILY PRN
Status: DISCONTINUED | OUTPATIENT
Start: 2021-01-29 | End: 2021-02-01 | Stop reason: HOSPADM

## 2021-01-29 RX ORDER — MIDAZOLAM HYDROCHLORIDE 2 MG/2ML
2 INJECTION, SOLUTION INTRAMUSCULAR; INTRAVENOUS ONCE
Status: COMPLETED | OUTPATIENT
Start: 2021-01-29 | End: 2021-01-29

## 2021-01-29 RX ORDER — LANOLIN ALCOHOL/MO/W.PET/CERES
6 CREAM (GRAM) TOPICAL NIGHTLY PRN
Status: DISCONTINUED | OUTPATIENT
Start: 2021-01-29 | End: 2021-02-01 | Stop reason: HOSPADM

## 2021-01-29 RX ORDER — FENTANYL CITRATE 50 UG/ML
50 INJECTION, SOLUTION INTRAMUSCULAR; INTRAVENOUS ONCE
Status: COMPLETED | OUTPATIENT
Start: 2021-01-29 | End: 2021-01-29

## 2021-01-29 RX ORDER — SODIUM CHLORIDE 0.9 % (FLUSH) 0.9 %
10 SYRINGE (ML) INJECTION EVERY 12 HOURS SCHEDULED
Status: DISCONTINUED | OUTPATIENT
Start: 2021-01-29 | End: 2021-01-29 | Stop reason: SDUPTHER

## 2021-01-29 RX ORDER — QUETIAPINE FUMARATE 100 MG/1
100 TABLET, FILM COATED ORAL NIGHTLY
Status: DISCONTINUED | OUTPATIENT
Start: 2021-01-30 | End: 2021-02-01 | Stop reason: HOSPADM

## 2021-01-29 RX ORDER — FENTANYL CITRATE 50 UG/ML
INJECTION, SOLUTION INTRAMUSCULAR; INTRAVENOUS
Status: COMPLETED
Start: 2021-01-29 | End: 2021-01-29

## 2021-01-29 RX ORDER — MIDAZOLAM HYDROCHLORIDE 1 MG/ML
INJECTION INTRAMUSCULAR; INTRAVENOUS
Status: COMPLETED
Start: 2021-01-29 | End: 2021-01-29

## 2021-01-29 RX ADMIN — FENTANYL CITRATE 50 MCG: 50 INJECTION, SOLUTION INTRAMUSCULAR; INTRAVENOUS at 08:44

## 2021-01-29 RX ADMIN — VALPROIC ACID 500 MG: 250 SOLUTION ORAL at 21:24

## 2021-01-29 RX ADMIN — Medication 6 MG: at 21:25

## 2021-01-29 RX ADMIN — MIDAZOLAM HYDROCHLORIDE 2 MG: 2 INJECTION, SOLUTION INTRAMUSCULAR; INTRAVENOUS at 08:43

## 2021-01-29 RX ADMIN — FAMOTIDINE 20 MG: 20 TABLET, FILM COATED ORAL at 21:21

## 2021-01-29 RX ADMIN — FAMOTIDINE 20 MG: 20 TABLET, FILM COATED ORAL at 09:23

## 2021-01-29 RX ADMIN — VALPROIC ACID 500 MG: 250 SOLUTION ORAL at 09:23

## 2021-01-29 RX ADMIN — SODIUM CHLORIDE, PRESERVATIVE FREE 10 ML: 5 INJECTION INTRAVENOUS at 09:23

## 2021-01-29 RX ADMIN — CEFTRIAXONE SODIUM 1000 MG: 1 INJECTION, POWDER, FOR SOLUTION INTRAMUSCULAR; INTRAVENOUS at 07:27

## 2021-01-29 RX ADMIN — MIDAZOLAM HYDROCHLORIDE 2 MG: 1 INJECTION, SOLUTION INTRAMUSCULAR; INTRAVENOUS at 08:43

## 2021-01-29 RX ADMIN — SODIUM CHLORIDE, PRESERVATIVE FREE 10 ML: 5 INJECTION INTRAVENOUS at 21:24

## 2021-01-29 RX ADMIN — ARIPIPRAZOLE 10 MG: 10 TABLET ORAL at 09:23

## 2021-01-29 RX ADMIN — QUETIAPINE FUMARATE 50 MG: 25 TABLET ORAL at 09:23

## 2021-01-29 ASSESSMENT — PAIN SCALES - GENERAL
PAINLEVEL_OUTOF10: 0
PAINLEVEL_OUTOF10: 0

## 2021-01-29 ASSESSMENT — PULMONARY FUNCTION TESTS
PIF_VALUE: 26
PIF_VALUE: 24
PIF_VALUE: 21
PIF_VALUE: 25

## 2021-01-29 NOTE — PROGRESS NOTES
Daily Progress Note  Neuro Critical Care    Patient Name: Moses Johnson  Patient : 1960  Room/Bed: 3741/8689-64  Code Status: Full code  Allergies: No Known Allergies    CHIEF COMPLAINT:      Seizure     INTERVAL HISTORY    Initial Presentation (Admitted 21): The patient is a 61 y.o. male with history of schizoaffective disorder on Depakote, presented with seizures. Patient was found unresponsive by wife, called EMS, found to have generalized tonic-clonic seizures outlying facility, intubated, sent to [facility]. Patient has no history of seizures, is on Depakote for schizoaffective disorder. Patient was found to be positive for cocaine on urine drug screen. CT head was unremarkable. Patient was loaded with Keppra, Depakote. There was also concern for pneumonia, patient was started on vancomycin and Zosyn. Hospital Course:   During assessment today, patient was been sedated with propofol being intubated. Patient had MRI that was negative for any concerning findings. Ultimately as the pain found patient to be in moderate to severe encephalopathy with no signs of seizure activity. Last 24h:   Overnight patient had no urine output concerns for possible misplacement of the Aquino balloon. Bedside ultrasound revealed the balloon was out of position and urology was consulted and replaced the Aquino. Patient then had good urine output. Urinalysis revealed a UTI was started on Rocephin. Patient was hemodynamically stable otherwise and afebrile. Lumbar puncture was performed at bedside this morning by Dr. Larissa Scott. Plan for extubation.     CURRENT MEDICATIONS:  SCHEDULED MEDICATIONS:   cefTRIAXone (ROCEPHIN) IV  1,000 mg Intravenous Q24H    ARIPiprazole  10 mg Oral Daily    QUEtiapine  50 mg Oral BID    sodium chloride flush  10 mL Intravenous 2 times per day    [Held by provider] enoxaparin  40 mg Subcutaneous Daily    valproic acid  500 mg Oral 2 times per day    famotidine  20 mg Oral BID     CONTINUOUS INFUSIONS:   sodium chloride 100 mL/hr at 21 1800    propofol 20 mcg/kg/min (21 0500)     PRN MEDICATIONS:   sodium chloride flush, promethazine **OR** ondansetron, polyethylene glycol, acetaminophen **OR** acetaminophen, sodium chloride flush    VITALS:  Temperature Range: Temp: 97.6 °F (36.4 °C) Temp  Av °F (36.7 °C)  Min: 97.3 °F (36.3 °C)  Max: 98.4 °F (36.9 °C)  BP Range: Systolic (63DOQ), BLT:710 , Min:83 , JULIANO:390     Diastolic (22ETP), XJO:59, Min:37, Max:97    Pulse Range: Pulse  Av.7  Min: 52  Max: 86  Respiration Range: Resp  Av.3  Min: 15  Max: 24  Current Pulse Ox: SpO2: 100 %  24HR Pulse Ox Range: SpO2  Av %  Min: 100 %  Max: 100 %  Patient Vitals for the past 12 hrs:   BP Temp Temp src Pulse Resp SpO2   21 0600 102/78 -- -- 52 16 100 %   21 0500 (!) 111/48 -- -- 55 16 100 %   21 0400 138/70 97.6 °F (36.4 °C) Oral 67 16 100 %   21 0336 -- -- -- 69 16 100 %   21 0300 (!) 148/73 -- -- 61 16 100 %   21 0200 114/60 -- -- 54 16 100 %   21 0100 99/64 -- -- 54 16 100 %   21 0000 (!) 114/57 97.3 °F (36.3 °C) -- 55 16 100 %   21 2332 -- -- -- 78 16 100 %   21 2306 (!) 125/57 -- -- 66 16 100 %   21 2300 -- -- -- 74 16 --   21 2200 (!) 99/38 -- -- 59 16 100 %   21 (!) 155/78 -- -- 86 15 100 %   21 (!) 142/65 98.3 °F (36.8 °C) Oral 67 16 100 %   21 -- -- -- 57 16 100 %     Estimated body mass index is 25.76 kg/m² as calculated from the following:    Height as of this encounter: 5' 6\" (1.676 m).     Weight as of this encounter: 159 lb 9.8 oz (72.4 kg).  []<16 Severe malnutrition  []16-16.99 Moderate malnutrition  []17-18.49 Mild malnutrition  []18.5-24.9 Normal  [x]25-29.9 Overweight (not obese)  []30-34.9 Obese class 1 (Low Risk)  []35-39.9 Obese class 2 (Moderate Risk)  []?40 Obese class 3 (High Risk)    RECENT LABS:   Lab Results   Component Value Date    WBC 7.2 01/29/2021    HGB 11.5 (L) 01/29/2021    HCT 37.5 (L) 01/29/2021     01/29/2021    ALT 13 01/28/2021    AST 29 01/28/2021     01/29/2021    K 3.8 01/29/2021     (H) 01/29/2021    CREATININE 0.77 01/29/2021    BUN 18 01/29/2021    CO2 21 01/29/2021    INR 1.0 01/28/2021     24 HOUR INTAKE/OUTPUT:    Intake/Output Summary (Last 24 hours) at 1/29/2021 4782  Last data filed at 1/29/2021 0500  Gross per 24 hour   Intake 4187.48 ml   Output 785 ml   Net 3402.48 ml       IMAGING:   Xr Chest Portable    Result Date: 1/28/2021  EXAMINATION: ONE XRAY VIEW OF THE CHEST 1/28/2021 8:46 am COMPARISON: None. HISTORY: ORDERING SYSTEM PROVIDED HISTORY: intubated TECHNOLOGIST PROVIDED HISTORY: intubated Reason for Exam: Supine port chest FINDINGS: Endotracheal tube with the tip 4.4 cm above the level of the smooth. Enteric tube extends beyond the gastroesophageal junction. No focal consolidation. Mild interstitial prominence. No cardiomegaly. Mild interstitial prominence may be related to mild pulmonary edema versus an atypical infection. Mri Brain With And Without Contrast    Result Date: 1/28/2021  EXAMINATION: MRI OF THE BRAIN WITHOUT AND WITH CONTRAST  1/28/2021 3:49 pm TECHNIQUE: Multiplanar multisequence MRI of the head/brain was performed without and with the administration of intravenous contrast. COMPARISON: None. HISTORY: ORDERING SYSTEM PROVIDED HISTORY: first time seizures TECHNOLOGIST PROVIDED HISTORY: first time seizures Initial evaluation. FINDINGS: INTRACRANIAL STRUCTURES/VENTRICLES:  There is no acute infarct. The hippocampal structures are symmetric. No abnormal T2 FLAIR signal within the medial temporal lobes. No mass effect or midline shift. No evidence of an acute intracranial hemorrhage.   Areas of T2 FLAIR hyperintensity are seen in the periventricular and subcortical white matter, which are nonspecific, but may represent chronic microvascular ischemic change. There is mild global parenchymal volume loss. The sellar/suprasellar regions appear unremarkable. The normal signal voids within the major intracranial vessels appear maintained. No abnormal focus of enhancement is seen within the brain. ORBITS: The visualized portion of the orbits demonstrate no acute abnormality. SINUSES: Scattered mucosal thickening of the paranasal sinuses. Trace mastoid effusions, right greater than. BONES/SOFT TISSUES: The bone marrow signal intensity appears normal. The soft tissues demonstrate no acute abnormality. 1. No acute intracranial abnormality. No acute infarct. 2. Mild global parenchymal volume loss with chronic microvascular ischemic changes. 3. Scattered mucosal thickening of the paranasal sinuses. 4. Trace mastoid effusions. Labs and Images reviewed with:  [] Dr. Bhaskar Jain. Evita    [x] Dr. Anjel Chaves  [] Dr. Sallie Blackwell  [] There are no new interval images to review. PHYSICAL EXAM       CONSTITUTIONAL:  Well developed, well nourished, intubated, following commands   HEAD:  normocephalic, atraumatic    EYES:  PERRLA, EOMI.   ENT:  moist mucous membranes, intubated   NECK:  supple, symmetric   LUNGS:  Equal air entry bilaterally   CARDIOVASCULAR:  normal s1 / s2, RRR, distal pulses intact   ABDOMEN:  Soft, no rigidity   NEUROLOGIC:  Mental Status:  Alert to person,awake             Cranial Nerves:    cranial nerves II-XII are grossly intact    Motor Exam:    Moves all extremities to commands    Sensory:  Moves all extremities to noxious stimuli         DRAINS:  [x] There are no drains for Neuro Critical Care to monitor at this time. ASSESSMENT AND PLAN:       66-year-old male presents with a history of schizoaffective disorder on Depakote as a mood stabilizer, presents for first-time seizures and intubated sedated on propofol.     NEUROLOGIC:  - Imaging    - CT Head at OSH revealed no acute intracranial process but did note

## 2021-01-29 NOTE — CARE COORDINATION
Attempted to see patient to discuss transition plan patient is intubated and getting a Lumbar puncture at this time. Will return when family arrives if time allows.

## 2021-01-29 NOTE — PROCEDURES
[address] [place] [address] 30        CONTINUOUS VIDEO EEG MONITORING           PATIENT: Freddy Medeiros  MRN #: [id number]  DATE: 1/28/2021 at 9:01AM to 1/29/2021 at 10:44PM  REFERRING PHYSICIAN:  Ben Burger MD     BRIEF HISTORY: Patient is a 61year old male with schizoaffective disorder who had multiple seizures, LTME was ordered to evaluate. AEDs:  VPA 500mg BID      EEG DESCRIPTION: 21 EEG electrodes were placed according to International 10/20 System. Single EKG electrode was also placed. Video recording was time-locked with EEG recording. BASELINE: The background was in admixed delta, theta, alpha and beta activities ranging between 10-50uV. There was no posterior dominant rhythm or eye opening/closing artifacts. Spontaneous variability was present. Hyperventilation and photic stimulation were not performed. Single lead EKG showed regular, heart rate at 70s per minute. Day 1 - recording started from 1/28/2021 at 9:01AM   AEDs: VPA 500mg BID     Interictal: There were bursts of diffuse beta/alpha activity alternated with bursts of generalized delta or decrement. From early evening, there were multiple lead artifacts. Ictal: Non    Summary: During above recoding period, there was evidence of moderate to severe diffuse encephalopathy. Day 2 - 1/29/2021 through 10:44PM    AEDs: VPA 500mg BID     Interictal: Generalized alpha and beta bursts alternated with generalized 1-2 second decrement. possible worse on left. Lead artifacts on left hemisphere. Ictal: button pushes at 8:34PM, 8:45PM, accidental    Summary: During above recoding period, there was evidence of moderate diffuse encephalopathy and possible worse on left side, but due to lead artifacts on left side, this can not be certain. No epileptiform discharges or EEG/clinical seizures were noted. CLASSIFICATION   Abnormal II (Lethargy)  1.  Intermittent rhythmic slow, generalized    IMPRESSION  The patient underwent continuous video EEG monitoring from 1/28/2021 at 9:01AM to 1/29/2021 at 10:44PM, which showed evidence of moderate diffuse encephalopathy and possible worse on left side. No epileptiform discharges or EEG/clinical seizures were noted.        Oneida Stafford MD,  [facility] Greer, Neurology  Board Certified Epileptologist

## 2021-01-29 NOTE — PLAN OF CARE
Problem: OXYGENATION/RESPIRATORY FUNCTION  Goal: Patient will achieve/maintain normal respiratory rate/effort  Description: Respiratory rate and effort will be within normal limits for the patient  1/28/2021 1930 by Sanjay Urrutia RCP  Outcome: Ongoing     Problem: MECHANICAL VENTILATION  Goal: Patient will maintain patent airway  1/28/2021 1930 by Sanjay Urrutia RCP  Outcome: Ongoing     Problem: MECHANICAL VENTILATION  Goal: Oral health is maintained or improved  1/28/2021 1930 by Sanjay Urrutia RCP  Outcome: Ongoing     Problem: MECHANICAL VENTILATION  Goal: ET tube will be managed safely  1/28/2021 1930 by Sanjay Urrutia RCP  Outcome: Ongoing     Problem: MECHANICAL VENTILATION  Goal: Ability to express needs and understand communication  1/28/2021 1930 by Sanjay Urrutia RCP  Outcome: Ongoing     Problem: MECHANICAL VENTILATION  Goal: Mobility/activity is maintained at optimum level for patient  1/28/2021 1930 by Sanjay Urrutia RCP  Outcome: Ongoing

## 2021-01-29 NOTE — PLAN OF CARE
Problem: Falls - Risk of:  Goal: Will remain free from falls  Description: Will remain free from falls  Outcome: Ongoing  Note: No falls entire shift. Fall precautions in place. Continue to monitor.

## 2021-01-29 NOTE — CONSULTS
Sachin Menendez MD [facility]    Urology Consultation    Patient:  Freddy Medeiros  MRN: [id number]  YOB: 1960    CHIEF COMPLAINT:  difficult noguera    HISTORY OF PRESENT ILLNESS:   The patient is a 61 y.o. male who presents with seizures. He is intubated and sedated. 12 [place] Noguera wasn't draining since admission and was repositioned however difficulty was noted to advance and blood was noted. He does have a history of BPH. No other history could be found. Patient's old records, notes and chart reviewed and summarized above. Past Medical History:    Past Medical History:   Diagnosis Date    BPH (benign prostatic hyperplasia)     Schizophrenia ([facility] [address]        Past Surgical History:    No past surgical history on file. Previous  surgery: none     Medications:    Scheduled Meds:   cefTRIAXone (ROCEPHIN) IV  1,000 mg Intravenous Q24H    ARIPiprazole  10 mg Oral Daily    QUEtiapine  50 mg Oral BID    sodium chloride flush  10 mL Intravenous 2 times per day    [Held by provider] enoxaparin  40 mg Subcutaneous Daily    valproic acid  500 mg Oral 2 times per day    famotidine  20 mg Oral BID     Continuous Infusions:   sodium chloride 100 mL/hr at 01/28/21 1800    propofol 20 mcg/kg/min (01/29/21 0500)     PRN Meds:.sennosides-docusate sodium, sodium chloride flush, promethazine **OR** ondansetron, polyethylene glycol, acetaminophen **OR** acetaminophen, sodium chloride flush    Allergies:  Patient has no known allergies.     Social History:    Social History     Socioeconomic History    Marital status:      Spouse name: Not on file    Number of children: Not on file    Years of education: Not on file    Highest education level: Not on file   Occupational History    Not on file   Social Needs    Financial resource strain: Not on file    Food insecurity     Worry: Not on file     Inability: Not on file    Transportation needs     Medical: Not on file     Non-medical: Not on file Tobacco Use    Smoking status: Current Every Day Smoker     Packs/day: 1.50     Years: 40.00     Pack years: 60.00     Types: Cigarettes    Smokeless tobacco: Never Used   Substance and Sexual Activity    Alcohol use: Not Currently    Drug use: Not Currently    Sexual activity: Not on file   Lifestyle    Physical activity     Days per week: Not on file     Minutes per session: Not on file    Stress: Not on file   Relationships    Social connections     Talks on phone: Not on file     Gets together: Not on file     Attends Anabaptism service: Not on file     Active member of club or organization: Not on file     Attends meetings of clubs or organizations: Not on file     Relationship status: Not on file    Intimate partner violence     Fear of current or ex partner: Not on file     Emotionally abused: Not on file     Physically abused: Not on file     Forced sexual activity: Not on file   Other Topics Concern    Not on file   Social History Narrative    Not on file       Family History:  No family history on file.       REVIEW OF SYSTEMS:  Unable to obtain due to patient's mental status     Physical Exam:    This a 61 y.o. male   Patient Vitals for the past 24 hrs:   BP Temp Temp src Pulse Resp SpO2   01/29/21 0600 102/78 -- -- 52 16 100 %   01/29/21 0500 (!) 111/48 -- -- 55 16 100 %   01/29/21 0400 138/70 97.6 °F (36.4 °C) Oral 67 16 100 %   01/29/21 0336 -- -- -- 69 16 100 %   01/29/21 0300 (!) 148/73 -- -- 61 16 100 %   01/29/21 0200 114/60 -- -- 54 16 100 %   01/29/21 0100 99/64 -- -- 54 16 100 %   01/29/21 0000 (!) 114/57 97.3 °F (36.3 °C) -- 55 16 100 %   01/28/21 2332 -- -- -- 78 16 100 %   01/28/21 2306 (!) 125/57 -- -- 66 16 100 %   01/28/21 2300 -- -- -- 74 16 --   01/28/21 2200 (!) 99/38 -- -- 59 16 100 %   01/28/21 2100 (!) 155/78 -- -- 86 15 100 %   01/28/21 2000 (!) 142/65 98.3 °F (36.8 °C) Oral 67 16 100 %   01/28/21 1924 -- -- -- 57 16 100 %   01/28/21 1800 (!) 130/52 -- -- 73 18 -- 01/28/21 1700 (!) 152/97 -- -- 84 16 --   01/28/21 1638 -- -- -- 85 24 100 %   01/28/21 1600 -- 98.4 °F (36.9 °C) Oral -- -- --   01/28/21 1500 (!) 118/59 -- -- 67 16 100 %   01/28/21 1400 136/72 -- -- 74 16 100 %   01/28/21 1300 (!) 91/47 -- -- 64 16 100 %   01/28/21 1215 -- -- -- 70 15 100 %   01/28/21 1200 (!) 105/58 98 °F (36.7 °C) Oral 67 16 100 %   01/28/21 1100 134/66 -- -- 84 16 100 %   01/28/21 1000 (!) 83/37 -- -- 65 16 100 %   01/28/21 0900 (!) 120/47 -- -- 82 16 100 %   01/28/21 0813 -- -- -- 69 16 100 %   01/28/21 0800 (!) 100/51 98.2 °F (36.8 °C) Oral 71 16 100 %     Constitutional: Patient in no acute distress; Neuro: sedated  Skin: Normal  Lungs: Respiratory effort normal, intubated  Cardiovascular:  Normal peripheral pulses  Abdomen: Soft, non-distended   Bladder non-tender and not distended. Lymphatics: no palpable lymphadenopathy  Penis normal and circumcised  Urethral meatus normal  Scrotal exam normal  Testicles normal bilaterally  Epididymis normal bilaterally    LABS:  Recent Labs     01/28/21  0823 01/29/21  0344   WBC 10.6 7.2   HGB 12.6* 11.5*   HCT 43.6 37.5*   MCV 98.4 91.9    206     Recent Labs     01/28/21  0823 01/29/21  0344    142   K 4.2 3.8   * 114*   CO2 18* 21   BUN 16 18   CREATININE 0.80 0.77     No results found for: PSA    Additional Lab/culture results:    Urinalysis:   Recent Labs     01/28/21  2359   COLORU RED*   PHUR 6.5   WBCUA 10 TO 20   RBCUA TOO NUMEROUS TO COUNT   MUCUS 1+*   TRICHOMONAS NOT REPORTED   YEAST NOT REPORTED   BACTERIA NOT REPORTED   SPECGRAV 1.025   LEUKOCYTESUR MODERATE*   UROBILINOGEN ELEVATED*   BILIRUBINUR NEGATIVE  Verified by ictotest.*        ----------------------------------------------------------------  Imaging Results:    Assessment and Plan   Impresson:    61year old male  Active  problem list  Noguera placement  Hx of BPH      Plan:    Noguera placement not difficult, patient pushing against noguera   Will plan for void trial once patient is extubated and more awake. Maintain noguera catheter for now, 22 [place] noguera was placed. Creatinine at baseline, 0.77  Please call urology with any other questions or concerns.      Scarlet Webb MD  7:18 AM 1/29/2021

## 2021-01-29 NOTE — PLAN OF CARE
Problem: Restraint Use - Nonviolent/Non-Self-Destructive Behavior:  Goal: Absence of restraint-related injury  Description: Absence of restraint-related injury  1/29/2021 0653 by Pako Ma RN  Outcome: Ongoing  Note: Restraints in use. Pt is purposeful and goes to tube. ROM done, vitals done. No injury noted. Continue to monitor.

## 2021-01-30 LAB
ABSOLUTE EOS #: 0.23 K/UL (ref 0–0.44)
ABSOLUTE IMMATURE GRANULOCYTE: 0.03 K/UL (ref 0–0.3)
ABSOLUTE LYMPH #: 1.37 K/UL (ref 1.1–3.7)
ABSOLUTE MONO #: 0.62 K/UL (ref 0.1–1.2)
ANION GAP SERPL CALCULATED.3IONS-SCNC: 8 MMOL/L (ref 9–17)
BASOPHILS # BLD: 1 % (ref 0–2)
BASOPHILS ABSOLUTE: 0.04 K/UL (ref 0–0.2)
BUN BLDV-MCNC: 13 MG/DL (ref 8–23)
BUN/CREAT BLD: ABNORMAL (ref 9–20)
CALCIUM SERPL-MCNC: 8.2 MG/DL (ref 8.6–10.4)
CHLORIDE BLD-SCNC: 109 MMOL/L (ref 98–107)
CO2: 23 MMOL/L (ref 20–31)
CREAT SERPL-MCNC: 0.72 MG/DL (ref 0.7–1.2)
DIFFERENTIAL TYPE: ABNORMAL
EOSINOPHILS RELATIVE PERCENT: 4 % (ref 1–4)
GFR AFRICAN AMERICAN: >60 ML/MIN
GFR NON-AFRICAN AMERICAN: >60 ML/MIN
GFR SERPL CREATININE-BSD FRML MDRD: ABNORMAL ML/MIN/{1.73_M2}
GFR SERPL CREATININE-BSD FRML MDRD: ABNORMAL ML/MIN/{1.73_M2}
GLUCOSE BLD-MCNC: 96 MG/DL (ref 70–99)
GLUCOSE BLD-MCNC: 98 MG/DL (ref 75–110)
HAV IGM SER IA-ACNC: NONREACTIVE
HCT VFR BLD CALC: 37.8 % (ref 40.7–50.3)
HEMOGLOBIN: 11.7 G/DL (ref 13–17)
HEPATITIS B CORE IGM ANTIBODY: NONREACTIVE
HEPATITIS B SURFACE ANTIGEN: NONREACTIVE
HEPATITIS C ANTIBODY: NONREACTIVE
IMMATURE GRANULOCYTES: 1 %
LYMPHOCYTES # BLD: 21 % (ref 24–43)
MAGNESIUM: 2 MG/DL (ref 1.6–2.6)
MCH RBC QN AUTO: 28.1 PG (ref 25.2–33.5)
MCHC RBC AUTO-ENTMCNC: 31 G/DL (ref 28.4–34.8)
MCV RBC AUTO: 90.6 FL (ref 82.6–102.9)
MONOCYTES # BLD: 10 % (ref 3–12)
NRBC AUTOMATED: 0 PER 100 WBC
PDW BLD-RTO: 13.6 % (ref 11.8–14.4)
PLATELET # BLD: 208 K/UL (ref 138–453)
PLATELET ESTIMATE: ABNORMAL
PMV BLD AUTO: 11.4 FL (ref 8.1–13.5)
POTASSIUM SERPL-SCNC: 4.1 MMOL/L (ref 3.7–5.3)
RBC # BLD: 4.17 M/UL (ref 4.21–5.77)
RBC # BLD: ABNORMAL 10*6/UL
RHEUMATOID FACTOR: <10 IU/ML
SEG NEUTROPHILS: 63 % (ref 36–65)
SEGMENTED NEUTROPHILS ABSOLUTE COUNT: 4.12 K/UL (ref 1.5–8.1)
SODIUM BLD-SCNC: 140 MMOL/L (ref 135–144)
WBC # BLD: 6.4 K/UL (ref 3.5–11.3)
WBC # BLD: ABNORMAL 10*3/UL

## 2021-01-30 PROCEDURE — 2580000003 HC RX 258: Performed by: STUDENT IN AN ORGANIZED HEALTH CARE EDUCATION/TRAINING PROGRAM

## 2021-01-30 PROCEDURE — 36415 COLL VENOUS BLD VENIPUNCTURE: CPT

## 2021-01-30 PROCEDURE — 2060000000 HC ICU INTERMEDIATE R&B

## 2021-01-30 PROCEDURE — 80048 BASIC METABOLIC PNL TOTAL CA: CPT

## 2021-01-30 PROCEDURE — 86225 DNA ANTIBODY NATIVE: CPT

## 2021-01-30 PROCEDURE — 82947 ASSAY GLUCOSE BLOOD QUANT: CPT

## 2021-01-30 PROCEDURE — 6370000000 HC RX 637 (ALT 250 FOR IP): Performed by: NURSE PRACTITIONER

## 2021-01-30 PROCEDURE — 85025 COMPLETE CBC W/AUTO DIFF WBC: CPT

## 2021-01-30 PROCEDURE — 6370000000 HC RX 637 (ALT 250 FOR IP): Performed by: STUDENT IN AN ORGANIZED HEALTH CARE EDUCATION/TRAINING PROGRAM

## 2021-01-30 PROCEDURE — 86038 ANTINUCLEAR ANTIBODIES: CPT

## 2021-01-30 PROCEDURE — 2000000003 HC NEURO ICU R&B

## 2021-01-30 PROCEDURE — 97530 THERAPEUTIC ACTIVITIES: CPT

## 2021-01-30 PROCEDURE — 2580000003 HC RX 258: Performed by: PSYCHIATRY & NEUROLOGY

## 2021-01-30 PROCEDURE — 6360000002 HC RX W HCPCS: Performed by: PSYCHIATRY & NEUROLOGY

## 2021-01-30 PROCEDURE — 80074 ACUTE HEPATITIS PANEL: CPT

## 2021-01-30 PROCEDURE — 97162 PT EVAL MOD COMPLEX 30 MIN: CPT

## 2021-01-30 PROCEDURE — 86431 RHEUMATOID FACTOR QUANT: CPT

## 2021-01-30 PROCEDURE — 6360000002 HC RX W HCPCS: Performed by: STUDENT IN AN ORGANIZED HEALTH CARE EDUCATION/TRAINING PROGRAM

## 2021-01-30 PROCEDURE — 86235 NUCLEAR ANTIGEN ANTIBODY: CPT

## 2021-01-30 PROCEDURE — 83735 ASSAY OF MAGNESIUM: CPT

## 2021-01-30 RX ORDER — LABETALOL HYDROCHLORIDE 5 MG/ML
10 INJECTION, SOLUTION INTRAVENOUS EVERY 10 MIN PRN
Status: DISCONTINUED | OUTPATIENT
Start: 2021-01-30 | End: 2021-02-01 | Stop reason: HOSPADM

## 2021-01-30 RX ORDER — AMLODIPINE BESYLATE 5 MG/1
5 TABLET ORAL DAILY
Status: DISCONTINUED | OUTPATIENT
Start: 2021-01-30 | End: 2021-02-01 | Stop reason: HOSPADM

## 2021-01-30 RX ADMIN — SODIUM CHLORIDE, PRESERVATIVE FREE 10 ML: 5 INJECTION INTRAVENOUS at 20:03

## 2021-01-30 RX ADMIN — VALPROIC ACID 500 MG: 250 SOLUTION ORAL at 13:22

## 2021-01-30 RX ADMIN — CEFTRIAXONE SODIUM 1000 MG: 1 INJECTION, POWDER, FOR SOLUTION INTRAMUSCULAR; INTRAVENOUS at 06:45

## 2021-01-30 RX ADMIN — AMLODIPINE BESYLATE 5 MG: 5 TABLET ORAL at 15:59

## 2021-01-30 RX ADMIN — VALPROIC ACID 500 MG: 250 SOLUTION ORAL at 20:02

## 2021-01-30 RX ADMIN — ARIPIPRAZOLE 10 MG: 10 TABLET ORAL at 13:22

## 2021-01-30 RX ADMIN — ENOXAPARIN SODIUM 40 MG: 40 INJECTION SUBCUTANEOUS at 13:22

## 2021-01-30 RX ADMIN — QUETIAPINE FUMARATE 100 MG: 100 TABLET ORAL at 20:03

## 2021-01-30 ASSESSMENT — PAIN SCALES - GENERAL
PAINLEVEL_OUTOF10: 0

## 2021-01-30 NOTE — PLAN OF CARE
Patient resting comfortable VS stable  a/o x 3 following commands, moving all extremities, maintaining  normal respiratory function without difficulty. Oxygen saturation  at  a 100%  on 2L. No new issue at this time will continue to monitor.

## 2021-01-30 NOTE — PROGRESS NOTES
Daily Progress Note  Neuro Critical Care    Patient Name: Isabel Head  Patient : 1960  Room/Bed: 2398/6188-15  Code Status: Full code  Allergies: No Known Allergies    CHIEF COMPLAINT:      Seizure     INTERVAL HISTORY    Initial Presentation (Admitted 21): The patient is a 61 y.o. male with history of schizoaffective disorder on Depakote, presented with seizures. Patient was found unresponsive by wife, called EMS, found to have generalized tonic-clonic seizures outlying facility, intubated, sent to [facility] for further evaluation. Patient has no history of seizures, is on Depakote for schizoaffective disorder. Patient was found to be positive for cocaine on urine drug screen. CT head was unremarkable. Patient was loaded with Keppra, Depakote. There was also concern for pneumonia, patient was started on vancomycin and Zosyn. Hospital Course:   : During assessment today, patient was been sedated with propofol being intubated. Patient had MRI that was negative for any concerning findings. Ultimately as the pain found patient to be in moderate to severe encephalopathy with no signs of seizure activity. : Overnight patient had no urine output concerns for possible misplacement of the Aquino balloon. Bedside ultrasound revealed the balloon was out of position and urology was consulted and replaced the Aquino. Patient then had good urine output. Urinalysis revealed a UTI was started on Rocephin. PLumbar puncture was performed at bedside this morning by Dr. Ale Luis. Extubated and following CSF labs. Last 24h:   No acute vents overnight. Hemodynamically stable. Afebrile. Patient tolerating p.o. intake without difficulty. Patient wishes to go home. Patient is denying any current nausea/vomiting, numbness/tingling, chest pain, shortness breath, abdominal pain, or dysuria.     CURRENT MEDICATIONS:  SCHEDULED MEDICATIONS:   cefTRIAXone (ROCEPHIN) IV  1,000 mg Intravenous Q24H    QUEtiapine  100 mg Oral Nightly    ARIPiprazole  10 mg Oral Daily    sodium chloride flush  10 mL Intravenous 2 times per day    enoxaparin  40 mg Subcutaneous Daily    valproic acid  500 mg Oral 2 times per day    famotidine  20 mg Oral BID     CONTINUOUS INFUSIONS:    PRN MEDICATIONS:   sennosides-docusate sodium, melatonin, sodium chloride flush, promethazine **OR** ondansetron, polyethylene glycol, acetaminophen **OR** acetaminophen, sodium chloride flush    VITALS:  Temperature Range: Temp: 98.5 °F (36.9 °C) Temp  Av.2 °F (36.8 °C)  Min: 97.8 °F (36.6 °C)  Max: 98.7 °F (37.1 °C)  BP Range: Systolic (32UFZ), UST:348 , Min:92 , TUR:088     Diastolic (04QKL), JHM:09, Min:41, Max:82    Pulse Range: Pulse  Av.6  Min: 51  Max: 96  Respiration Range: Resp  Av.4  Min: 14  Max: 22  Current Pulse Ox: SpO2: 98 %  24HR Pulse Ox Range: SpO2  Av.8 %  Min: 98 %  Max: 100 %  Patient Vitals for the past 12 hrs:   BP Temp Temp src Pulse Resp SpO2   21 0000 (!) 92/44 98.5 °F (36.9 °C) Oral 68 18 98 %   21 2300 (!) 100/52 -- -- 87 20 100 %   21 2200 (!) 92/41 -- -- 71 18 100 %   21 2100 (!) 137/47 -- -- 85 18 100 %   21 2000 (!) 132/52 98.3 °F (36.8 °C) Oral 92 18 100 %   21 1900 (!) 128/50 -- -- 93 21 100 %     Estimated body mass index is 25.76 kg/m² as calculated from the following:    Height as of this encounter: 5' 6\" (1.676 m).     Weight as of this encounter: 159 lb 9.8 oz (72.4 kg).  []<16 Severe malnutrition  []16-16.99 Moderate malnutrition  []17-18.49 Mild malnutrition  []18.5-24.9 Normal  [x]25-29.9 Overweight (not obese)  []30-34.9 Obese class 1 (Low Risk)  []35-39.9 Obese class 2 (Moderate Risk)  []?40 Obese class 3 (High Risk)    RECENT LABS:   Lab Results   Component Value Date    WBC 6.4 2021    HGB 11.7 (L) 2021    HCT 37.8 (L) 2021     2021    ALT 13 2021    AST 29 2021     2021    K 4.1 01/30/2021     (H) 01/30/2021    CREATININE 0.72 01/30/2021    BUN 13 01/30/2021    CO2 23 01/30/2021    INR 1.0 01/28/2021     24 HOUR INTAKE/OUTPUT:    Intake/Output Summary (Last 24 hours) at 1/30/2021 0636  Last data filed at 1/30/2021 0000  Gross per 24 hour   Intake 60 ml   Output 1265 ml   Net -1205 ml       IMAGING:   Xr Chest Portable    Result Date: 1/28/2021  EXAMINATION: ONE XRAY VIEW OF THE CHEST 1/28/2021 8:46 am COMPARISON: None. HISTORY: ORDERING SYSTEM PROVIDED HISTORY: intubated TECHNOLOGIST PROVIDED HISTORY: intubated Reason for Exam: Supine port chest FINDINGS: Endotracheal tube with the tip 4.4 cm above the level of the smooth. Enteric tube extends beyond the gastroesophageal junction. No focal consolidation. Mild interstitial prominence. No cardiomegaly. Mild interstitial prominence may be related to mild pulmonary edema versus an atypical infection. Mri Brain With And Without Contrast    Result Date: 1/28/2021  EXAMINATION: MRI OF THE BRAIN WITHOUT AND WITH CONTRAST  1/28/2021 3:49 pm TECHNIQUE: Multiplanar multisequence MRI of the head/brain was performed without and with the administration of intravenous contrast. COMPARISON: None. HISTORY: ORDERING SYSTEM PROVIDED HISTORY: first time seizures TECHNOLOGIST PROVIDED HISTORY: first time seizures Initial evaluation. FINDINGS: INTRACRANIAL STRUCTURES/VENTRICLES:  There is no acute infarct. The hippocampal structures are symmetric. No abnormal T2 FLAIR signal within the medial temporal lobes. No mass effect or midline shift. No evidence of an acute intracranial hemorrhage. Areas of T2 FLAIR hyperintensity are seen in the periventricular and subcortical white matter, which are nonspecific, but may represent chronic microvascular ischemic change. There is mild global parenchymal volume loss. The sellar/suprasellar regions appear unremarkable. The normal signal voids within the major intracranial vessels appear maintained.   No abnormal focus of enhancement is seen within the brain. ORBITS: The visualized portion of the orbits demonstrate no acute abnormality. SINUSES: Scattered mucosal thickening of the paranasal sinuses. Trace mastoid effusions, right greater than. BONES/SOFT TISSUES: The bone marrow signal intensity appears normal. The soft tissues demonstrate no acute abnormality. 1. No acute intracranial abnormality. No acute infarct. 2. Mild global parenchymal volume loss with chronic microvascular ischemic changes. 3. Scattered mucosal thickening of the paranasal sinuses. 4. Trace mastoid effusions. Labs and Images reviewed with:  [] Dr. Celeste Molina. Evita    [x] Dr. Justice Serrano  [] Dr. Willo Najjar  [] There are no new interval images to review. PHYSICAL EXAM       CONSTITUTIONAL:  Well developed, well nourished, intubated, following commands   HEAD:  normocephalic, atraumatic    EYES:  PERRLA, EOMI.   ENT:  moist mucous membranes, intubated   NECK:  supple, symmetric   LUNGS:  Equal air entry bilaterally   CARDIOVASCULAR:  normal s1 / s2, RRR, distal pulses intact   ABDOMEN:  Soft, no rigidity   NEUROLOGIC:  Cranial Nerves:    III: Pupils:  equal, round, reactive to light  III,IV,VI: Extra Ocular Movements: intact  V: Facial sensation:  intact  VII: Facial strength: intact  VIII: Hearing:  intact  IX: Palate:  intact  XI: Shoulder shrug:  intact  XII: Tongue movement:  normal    Motor Exam:    Drift:  absent  Tone:  normal    Motor exam is symmetrical 5 out of 5 all extremities bilaterally    Sensory:    Touch:    Right Upper Extremity:  normal  Left Upper Extremity:  normal  Right Lower Extremity:  normal  Left Lower Extremity:  normal         DRAINS:  [x] There are no drains for Neuro Critical Care to monitor at this time.      ASSESSMENT AND PLAN:       60-year-old male presents with a history of schizoaffective disorder on Depakote as a mood stabilizer, presents for first-time seizures and intubated sedated on propofol. NEUROLOGIC:  - Imaging    - CT Head at OSH revealed no acute intracranial process but did note mild old microvascular ischemic changes and age-related cerebral atrophy.   - MRI Brain was revealed global parenchymal volume loss and microvascular ischemic changes. No signs of infarct or any other abnormalities. - AEDs    -Stopped Keppra  - Goal SBP normotensive  - Await results of CFS panel  - Neuro checks per protocol    CARDIOVASCULAR:  - Goal SBP normotensive  - Continue telemetry    PULMONARY:  - On RA  - encourage IS    RENAL/FLUID/ELECTROLYTE:  - BUN 13/ Creatinine 0.72  - Urine output 0.9 ml/kg/hr  - Traumatic noguera placement   -Urology placed Noguera at bedside   -Possible void trial when patient is extubated. - Replace electrolytes PRN  - Daily BMP    GI/NUTRITION:  NUTRITION:  DIET GENERAL;  - Bowel regimen: senokot-s and GlycoLax as needed  - GI prophylaxis: none    ID:  -Afebrile  - WBC 6.4  - UTI   - + Nitrites/mod. Leukocyte esterase/10-20 WBCs   - Rocephin 1g QD x 2/5 days  - Continue to monitor for fevers  - Daily CBC    HEME:   - Hgb 11.7  - Platelets 458  - Daily CBC    ENDOCRINE:  - Continue to monitor blood glucose, goal <180      OTHER:  - PT/OT  - Code Status: Full    PROPHYLAXIS:  Stress ulcer: none    DVT PROPHYLAXIS:  - SCD sleeves - Thigh High   - Lovenox    DISPOSITION:  [x] Possible discharge today vs staying in the Neuro ICU. We will continue to follow along. For any changes in exam or patient status please contact Neuro Critical Care.       Nikunj Boggs DO  Neuro Critical Care  Pager [phone]  1/30/2021     6:36 AM

## 2021-01-30 NOTE — PROGRESS NOTES
Physical Therapy    Facility/Department: [address]  Initial Assessment    NAME: Miya Richter  : 1960  MRN: [id number]    Date of Service: 2021    Discharge Recommendations:    Further therapy recommended at discharge. PT Equipment Recommendations  Equipment Needed: (Continue to assess pending progression of mobility- pt currently unsafe to perform functional mobility unassisted)    Assessment   Body structures, Functions, Activity limitations: Decreased functional mobility ; Decreased posture;Decreased cognition;Decreased endurance;Decreased ROM; Decreased strength;Decreased balance; Increased pain;Decreased safe awareness  Assessment: Pt ambulated 60ft w/ RW Dana, pt requiring modA to correct multiple LOB's throughout session. Pt demonstrates significant decrease safety awareness and impulsivness and is a high fall risk at this time. Recommending continued skilled physical therapy to address functional mobility deficits and ensure safety mobility to return pt to prior level of independence. Prognosis: Good  Decision Making: Medium Complexity  PT Education: Goals;PT Role;Plan of Care;General Safety  Barriers to Learning: Pt's cognition  REQUIRES PT FOLLOW UP: Yes  Activity Tolerance  Activity Tolerance: Patient Tolerated treatment well;Patient limited by cognitive status       Patient Diagnosis(es): There were no encounter diagnoses. has a past medical history of BPH (benign prostatic hyperplasia) and Schizophrenia ([facility] [address] has no past surgical history on file.     Restrictions  Restrictions/Precautions  Restrictions/Precautions: Fall Risk  Required Braces or Orthoses?: No  Vision/Hearing  Vision: Within Functional Limits  Hearing: Within functional limits     Subjective  General  Patient assessed for rehabilitation services?: Yes  Response To Previous Treatment: Not applicable  Family / Caregiver Present: No  Follows Commands: Within Functional Limits(pt follows commands with significant repetition)  Subjective  Subjective: RN and pt in agreement for PT eval; pt seated in bed upon PT arrival, pt very impulsive throughout session requiring max verbal cueing for safety. Pt intermittently slurring speech throughout session. Pain Screening  Patient Currently in Pain: Denies  Vital Signs  Patient Currently in Pain: Denies       Orientation  Orientation  Overall Orientation Status: Impaired  Orientation Level: Disoriented to situation;Oriented to time;Oriented to person;Oriented to place  Social/Functional History  Social/Functional History  Lives With: Spouse  Type of Home: House  Home Layout: Two level  Home Access: Stairs to enter without rails  Entrance Stairs - Number of Steps: \"A few\"  Bathroom Shower/Tub: Tub/Shower unit  Bathroom Toilet: Standard  ADL Assistance: Independent  Homemaking Assistance: Independent  Homemaking Responsibilities: Yes(Shared with wife)  Ambulation Assistance: Independent(Pt independently ambulates with no AD at baseline)  Transfer Assistance: Independent  Active : No  Mode of Transportation: Family  Occupation: On disability  Leisure & Hobbies: Watching football  Additional Comments: Pt reports spouse is able to provide prn assist upon discharge. Cognition   Cognition  Overall Cognitive Status: Exceptions  Following Commands: Follows multistep commands with repitition  Attention Span: Difficulty attending to directions; Difficulty dividing attention  Memory: Decreased short term memory  Safety Judgement: Decreased awareness of need for assistance;Decreased awareness of need for safety  Problem Solving: Decreased awareness of errors;Assistance required to identify errors made  Insights: Not aware of deficits  Initiation: Requires cues for some  Sequencing: Requires cues for some  Cognition Comment: Pt demonstrates significant impulsiveness and decreased safety awareness throguhout.  Pt required max verbal cueing to decrease fall risk and prevent pulling at chuckie    Objective  Joint Mobility  Spine: WFL  ROM RLE: WFL  ROM LLE: WFL  ROM RUE: WFL  ROM LUE: WFL  Strength RLE  Strength RLE: WFL  Strength LLE  Strength LLE: WFL  Strength RUE  Strength RUE: WFL  Strength LUE  Strength LUE: WFL  Motor Control  Gross Motor?: WFL  Sensation  Overall Sensation Status: WFL(pt denies numbness/tingling)  Bed mobility  Comment: Did not assess- pt seated in bedside chair upon writer's arrival, retired to alarmed bedside chair upon writer's exit. Transfers  Sit to Stand: Contact guard assistance  Stand to sit: Contact guard assistance  Comment: Pt required max verbal cueing for safety awareness and line acknowledgement with poor return demo. Pt extremely impulsive, requiring modA to correct one LOB upon standing. Ambulation  Ambulation?: Yes  More Ambulation?: Yes  Ambulation 1  Surface: level tile  Device: No Device  Assistance: Minimal assistance  Quality of Gait: Narrow CARLTON; significant scissoring of gait; increased lateral sway; significant unsteadiness  Distance: 6ft  Comments: Pt demonstrates significant unsteadiness with ambulation with no AD, requiring modA to correct one LOB. Pt impulsive, reaching for objects within hallway and room despite max cueing  Ambulation 2  Surface - 2: level tile  Device 2: Rolling Walker  Assistance 2: Minimal assistance  Quality of Gait 2: Unsafe gait speed; narrow CARLTON  Gait Deviations: Decreased step length  Distance: 60ft  Comments: Pt required max verbal cueing to maintain CARLTON within RW with poor return demo, demonstrating accelerated gait speed depsite max verbal cueing for safety. Pt demonstrates difficulty with RW progression around objects in hallway throughout ambulation.   Stairs/Curb  Stairs?: No     Balance  Posture: Fair  Sitting - Static: Good  Sitting - Dynamic: Good;-  Standing - Static: Fair;+  Standing - Dynamic: Fair;-  Comments: Standing balance assessed w/ RW        Plan   Plan  Times per week: 5-6x/week  Current Treatment Recommendations: Strengthening, Transfer Training, Endurance Training, Patient/Caregiver Education & Training, ROM, Balance Training, Gait Training, Home Exercise Program, Functional Mobility Training, Stair training, Safety Education & Training  Safety Devices  Type of devices: Patient at risk for falls, Call light within reach, Left in chair, Positioning belt, Gait belt, Nurse notified, Chair alarm in place  Restraints  Initially in place: No  AM-PAC Score     AM-PAC Inpatient Mobility without Stair Climbing Raw Score : 15 (01/30/21 1436)  AM-PAC Inpatient without Stair Climbing T-Scale Score : 43.03 (01/30/21 1436)  Mobility Inpatient CMS 0-100% Score: 47.43 (01/30/21 1436)  Mobility Inpatient without Stair CMS G-Code Modifier : CK (01/30/21 1436)       Goals  Short term goals  Time Frame for Short term goals: 14  Short term goal 1: Pt to perform bed mobility independently  Short term goal 2: Demonstrate functional transfers SBA  Short term goal 3: Ambulate 300ft w/ least restrictive AD demonstrating increased safety awareness throughout  Short term goal 4: Ascend/descend 4 stairs with one handrail to simulate home environment SBA  Patient Goals   Patient goals : Did not state       Therapy Time   Individual Concurrent Group Co-treatment   Time In 1130         Time Out 1153         Minutes 23         Timed Code Treatment Minutes: 9 Minutes       Gail Romano, PT

## 2021-01-30 NOTE — CARE COORDINATION
Case Management Initial Discharge Plan  Bradford Yañez,             Met with:patient to discuss discharge plans. Information verified: address, contacts, phone number, , insurance Yes    Emergency Contact/Next of Kin name & number: Dasha Curry [phone]    PCP: Gisel Hackett MD  Date of last visit: past year    Insurance Provider: [address] and Medicare    Discharge Planning    Living Arrangements:    lives with wife  Support Systems:   family and friends    Home has 2 stories  few stairs to climb to get into front door, flightstairs to climb to reach second floor  Location of bedroom/bathroom in home main    Patient able to perform ADL's:Independent    Current Services (outpatient & in home) none   DME equipment: none   DME provider: n/a    Receiving oral anticoagulation therapy? No    If indicated:   Physician managing anticoagulation treatment: n/a  Where does patient obtain lab work for ATC treatment? n/a      Potential Assistance Needed:       Patient agreeable to home care: Yes if needed  Freedom of choice provided:  yes    Prior SNF/Rehab Placement and Facility:   Agreeable to SNF/Rehab: No  [place] of choice provided: n/a     Evaluation: no    Expected Discharge date:       Patient expects to be discharged to: Follow Up Appointment: Best Day/ Time:      Transportation provider: wife  Transportation arrangements needed for discharge: Yes    Readmission Risk              Risk of Unplanned Readmission:        11             Does patient have a readmission risk score greater than 14?: No  If yes, follow-up appointment must be made within 7 days of discharge.      Goals of Care: safety      Discharge Plan: homeindependently          Electronically signed by Reddy RN on 21 at 9:23 AM EST

## 2021-01-31 LAB
ABSOLUTE EOS #: 0.29 K/UL (ref 0–0.44)
ABSOLUTE IMMATURE GRANULOCYTE: <0.03 K/UL (ref 0–0.3)
ABSOLUTE LYMPH #: 2.23 K/UL (ref 1.1–3.7)
ABSOLUTE MONO #: 0.7 K/UL (ref 0.1–1.2)
ANION GAP SERPL CALCULATED.3IONS-SCNC: 8 MMOL/L (ref 9–17)
B BURGDORFERI AB,CSF: 0.26 LIV
BASOPHILS # BLD: 1 % (ref 0–2)
BASOPHILS ABSOLUTE: 0.06 K/UL (ref 0–0.2)
BUN BLDV-MCNC: 14 MG/DL (ref 8–23)
BUN/CREAT BLD: ABNORMAL (ref 9–20)
CALCIUM SERPL-MCNC: 8 MG/DL (ref 8.6–10.4)
CHLORIDE BLD-SCNC: 108 MMOL/L (ref 98–107)
CO2: 23 MMOL/L (ref 20–31)
CREAT SERPL-MCNC: 0.76 MG/DL (ref 0.7–1.2)
DIFFERENTIAL TYPE: ABNORMAL
EOSINOPHILS RELATIVE PERCENT: 5 % (ref 1–4)
GFR AFRICAN AMERICAN: >60 ML/MIN
GFR NON-AFRICAN AMERICAN: >60 ML/MIN
GFR SERPL CREATININE-BSD FRML MDRD: ABNORMAL ML/MIN/{1.73_M2}
GFR SERPL CREATININE-BSD FRML MDRD: ABNORMAL ML/MIN/{1.73_M2}
GLUCOSE BLD-MCNC: 91 MG/DL (ref 70–99)
HCT VFR BLD CALC: 40 % (ref 40.7–50.3)
HEMOGLOBIN: 12.4 G/DL (ref 13–17)
IMMATURE GRANULOCYTES: 0 %
LYMPHOCYTES # BLD: 36 % (ref 24–43)
MAGNESIUM: 1.9 MG/DL (ref 1.6–2.6)
MCH RBC QN AUTO: 27.9 PG (ref 25.2–33.5)
MCHC RBC AUTO-ENTMCNC: 31 G/DL (ref 28.4–34.8)
MCV RBC AUTO: 90.1 FL (ref 82.6–102.9)
MONOCYTES # BLD: 11 % (ref 3–12)
NRBC AUTOMATED: 0 PER 100 WBC
PDW BLD-RTO: 13.5 % (ref 11.8–14.4)
PLATELET # BLD: 228 K/UL (ref 138–453)
PLATELET ESTIMATE: ABNORMAL
PMV BLD AUTO: 11.3 FL (ref 8.1–13.5)
POTASSIUM SERPL-SCNC: 3.7 MMOL/L (ref 3.7–5.3)
RBC # BLD: 4.44 M/UL (ref 4.21–5.77)
RBC # BLD: ABNORMAL 10*6/UL
SEG NEUTROPHILS: 47 % (ref 36–65)
SEGMENTED NEUTROPHILS ABSOLUTE COUNT: 2.93 K/UL (ref 1.5–8.1)
SODIUM BLD-SCNC: 139 MMOL/L (ref 135–144)
WBC # BLD: 6.2 K/UL (ref 3.5–11.3)
WBC # BLD: ABNORMAL 10*3/UL

## 2021-01-31 PROCEDURE — 2580000003 HC RX 258: Performed by: STUDENT IN AN ORGANIZED HEALTH CARE EDUCATION/TRAINING PROGRAM

## 2021-01-31 PROCEDURE — 6370000000 HC RX 637 (ALT 250 FOR IP): Performed by: STUDENT IN AN ORGANIZED HEALTH CARE EDUCATION/TRAINING PROGRAM

## 2021-01-31 PROCEDURE — 85025 COMPLETE CBC W/AUTO DIFF WBC: CPT

## 2021-01-31 PROCEDURE — 2580000003 HC RX 258: Performed by: PSYCHIATRY & NEUROLOGY

## 2021-01-31 PROCEDURE — 97116 GAIT TRAINING THERAPY: CPT

## 2021-01-31 PROCEDURE — 83735 ASSAY OF MAGNESIUM: CPT

## 2021-01-31 PROCEDURE — 6360000002 HC RX W HCPCS: Performed by: PSYCHIATRY & NEUROLOGY

## 2021-01-31 PROCEDURE — 99232 SBSQ HOSP IP/OBS MODERATE 35: CPT | Performed by: PSYCHIATRY & NEUROLOGY

## 2021-01-31 PROCEDURE — 80048 BASIC METABOLIC PNL TOTAL CA: CPT

## 2021-01-31 PROCEDURE — 2060000000 HC ICU INTERMEDIATE R&B

## 2021-01-31 PROCEDURE — 6360000002 HC RX W HCPCS: Performed by: STUDENT IN AN ORGANIZED HEALTH CARE EDUCATION/TRAINING PROGRAM

## 2021-01-31 PROCEDURE — 6370000000 HC RX 637 (ALT 250 FOR IP): Performed by: PSYCHIATRY & NEUROLOGY

## 2021-01-31 PROCEDURE — 36415 COLL VENOUS BLD VENIPUNCTURE: CPT

## 2021-01-31 RX ADMIN — ARIPIPRAZOLE 10 MG: 10 TABLET ORAL at 08:33

## 2021-01-31 RX ADMIN — SODIUM CHLORIDE, PRESERVATIVE FREE 10 ML: 5 INJECTION INTRAVENOUS at 21:09

## 2021-01-31 RX ADMIN — ENOXAPARIN SODIUM 40 MG: 40 INJECTION SUBCUTANEOUS at 08:33

## 2021-01-31 RX ADMIN — DIVALPROEX SODIUM 750 MG: 250 TABLET, FILM COATED, EXTENDED RELEASE ORAL at 20:50

## 2021-01-31 RX ADMIN — Medication 6 MG: at 20:49

## 2021-01-31 RX ADMIN — QUETIAPINE FUMARATE 100 MG: 100 TABLET ORAL at 20:49

## 2021-01-31 RX ADMIN — AMLODIPINE BESYLATE 5 MG: 5 TABLET ORAL at 08:33

## 2021-01-31 RX ADMIN — CEFTRIAXONE SODIUM 1000 MG: 1 INJECTION, POWDER, FOR SOLUTION INTRAMUSCULAR; INTRAVENOUS at 06:23

## 2021-01-31 ASSESSMENT — PAIN SCALES - GENERAL
PAINLEVEL_OUTOF10: 0
PAINLEVEL_OUTOF10: 0

## 2021-01-31 NOTE — PLAN OF CARE
Problem: Falls - Risk of:  Goal: Will remain free from falls  Description: Will remain free from falls  1/30/2021 2124 by Kalin Mayes RN  Outcome: Ongoing  1/30/2021 1522 by Talon Shultz RN  Outcome: Ongoing  Goal: Absence of physical injury  Description: Absence of physical injury  1/30/2021 2124 by Kalin Mayes RN  Outcome: Ongoing  1/30/2021 1522 by Talon Shultz RN  Outcome: Ongoing

## 2021-01-31 NOTE — PROGRESS NOTES
Daily Progress Note  Neuro Critical Care    Patient Name: Moses Johnson  Patient : 1960  Room/Bed: 7929/1509-14  Code Status: Full code  Allergies: No Known Allergies    CHIEF COMPLAINT:      Seizure     INTERVAL HISTORY    Initial Presentation (Admitted 21): The patient is a 61 y.o. male with history of schizoaffective disorder on Depakote, presented with seizures. Patient was found unresponsive by wife, called EMS, found to have generalized tonic-clonic seizures outlying facility, intubated, sent to [facility] for further evaluation. Patient has no history of seizures, is on Depakote for schizoaffective disorder. Patient was found to be positive for cocaine on urine drug screen. CT head was unremarkable. Patient was loaded with Keppra, Depakote. There was also concern for pneumonia, patient was started on vancomycin and Zosyn. Hospital Course:   : During assessment today, patient was been sedated with propofol being intubated. Patient had MRI that was negative for any concerning findings. Ultimately as the pain found patient to be in moderate to severe encephalopathy with no signs of seizure activity. : Overnight patient had no urine output concerns for possible misplacement of the Aquino balloon. Bedside ultrasound revealed the balloon was out of position and urology was consulted and replaced the Aquino. Patient then had good urine output. Urinalysis revealed a UTI was started on Rocephin. PLumbar puncture was performed at bedside this morning by Dr. Larissa Scott. Extubated and following CSF labs. : Pt tolerating PO. Exam is clinically stable. Last 24h:   No acute vents overnight. Hemodynamically stable. Afebrile. Patient tolerating p.o. intake without difficulty. Patient was advised to go home today and will try to get up out of bed.   Patient is denying any current chest pain, shortness breath, nausea/vomiting, abdominal pain, numbness/tingling, or 01/31/2021     (H) 01/31/2021    CREATININE 0.76 01/31/2021    BUN 14 01/31/2021    CO2 23 01/31/2021    INR 1.0 01/28/2021     24 HOUR INTAKE/OUTPUT:    Intake/Output Summary (Last 24 hours) at 1/31/2021 0612  Last data filed at 1/31/2021 0400  Gross per 24 hour   Intake 50 ml   Output 1720 ml   Net -1670 ml       IMAGING:   Xr Chest Portable    Result Date: 1/28/2021  EXAMINATION: ONE XRAY VIEW OF THE CHEST 1/28/2021 8:46 am COMPARISON: None. HISTORY: ORDERING SYSTEM PROVIDED HISTORY: intubated TECHNOLOGIST PROVIDED HISTORY: intubated Reason for Exam: Supine port chest FINDINGS: Endotracheal tube with the tip 4.4 cm above the level of the smooth. Enteric tube extends beyond the gastroesophageal junction. No focal consolidation. Mild interstitial prominence. No cardiomegaly. Mild interstitial prominence may be related to mild pulmonary edema versus an atypical infection. Mri Brain With And Without Contrast    Result Date: 1/28/2021  EXAMINATION: MRI OF THE BRAIN WITHOUT AND WITH CONTRAST  1/28/2021 3:49 pm TECHNIQUE: Multiplanar multisequence MRI of the head/brain was performed without and with the administration of intravenous contrast. COMPARISON: None. HISTORY: ORDERING SYSTEM PROVIDED HISTORY: first time seizures TECHNOLOGIST PROVIDED HISTORY: first time seizures Initial evaluation. FINDINGS: INTRACRANIAL STRUCTURES/VENTRICLES:  There is no acute infarct. The hippocampal structures are symmetric. No abnormal T2 FLAIR signal within the medial temporal lobes. No mass effect or midline shift. No evidence of an acute intracranial hemorrhage. Areas of T2 FLAIR hyperintensity are seen in the periventricular and subcortical white matter, which are nonspecific, but may represent chronic microvascular ischemic change. There is mild global parenchymal volume loss. The sellar/suprasellar regions appear unremarkable. The normal signal voids within the major intracranial vessels appear maintained.   No abnormal focus of enhancement is seen within the brain. ORBITS: The visualized portion of the orbits demonstrate no acute abnormality. SINUSES: Scattered mucosal thickening of the paranasal sinuses. Trace mastoid effusions, right greater than. BONES/SOFT TISSUES: The bone marrow signal intensity appears normal. The soft tissues demonstrate no acute abnormality. 1. No acute intracranial abnormality. No acute infarct. 2. Mild global parenchymal volume loss with chronic microvascular ischemic changes. 3. Scattered mucosal thickening of the paranasal sinuses. 4. Trace mastoid effusions. Labs and Images reviewed with:  [] Dr. Angelina Hinton. Evita    [x] Dr. Joya Falcon  [] Dr. Francisco Javier Vallejo  [] There are no new interval images to review. PHYSICAL EXAM       CONSTITUTIONAL:  Well developed, well nourished, intubated, following commands   HEAD:  normocephalic, atraumatic    EYES:  PERRLA, EOMI.   ENT:  moist mucous membranes, intubated   NECK:  supple, symmetric   LUNGS:  Equal air entry bilaterally   CARDIOVASCULAR:  normal s1 / s2, RRR, distal pulses intact   ABDOMEN:  Soft, no rigidity   NEUROLOGIC:  Cranial Nerves:    III: Pupils:  equal, round, reactive to light  III,IV,VI: Extra Ocular Movements: intact  V: Facial sensation:  intact  VII: Facial strength: intact  VIII: Hearing:  intact  IX: Palate:  intact  XI: Shoulder shrug:  intact  XII: Tongue movement:  normal    Motor Exam:    Drift:  absent  Tone:  normal    Motor exam is symmetrical 5 out of 5 all extremities bilaterally    Sensory:    Touch:    Right Upper Extremity:  normal  Left Upper Extremity:  normal  Right Lower Extremity:  normal  Left Lower Extremity:  normal         DRAINS:  [x] There are no drains for Neuro Critical Care to monitor at this time.      ASSESSMENT AND PLAN:       61-year-old male presents with a history of schizoaffective disorder on Depakote as a mood stabilizer, presents for first-time seizures and intubated sedated on propofol. NEUROLOGIC:  - Imaging    - CT Head at OSH revealed no acute intracranial process but did note mild old microvascular ischemic changes and age-related cerebral atrophy.   - MRI Brain was revealed global parenchymal volume loss and microvascular ischemic changes. No signs of infarct or any other abnormalities. - AEDs    -Stopped Keppra  - Goal SBP normotensive  - Await results of CFS panel  - Neuro checks per protocol    CARDIOVASCULAR:  - Goal SBP normotensive  - Continue telemetry    PULMONARY:  - On RA  - encourage IS    RENAL/FLUID/ELECTROLYTE:  - BUN 14/ Creatinine 0.76  - Urine output 0.9 ml/kg/hr  - Traumatic noguera placement   -Urology placed Noguera at bedside   -Possible void trial per urology  - Replace electrolytes PRN  - Daily BMP    GI/NUTRITION:  NUTRITION:  DIET GENERAL;  - Bowel regimen: senokot-s and GlycoLax as needed  - GI prophylaxis: none    ID:  -Afebrile  - WBC 6.2  - UTI   - + Nitrites/mod. Leukocyte esterase/10-20 WBCs   - Rocephin 1g QD x32/5 days  - Continue to monitor for fevers  - Daily CBC    HEME:   - Hgb 12.4  - Platelets 493  - Daily CBC    ENDOCRINE:  - Continue to monitor blood glucose, goal <180      OTHER:  - PT/OT  - Code Status: Full  -To get up and walk around if he wants to be discharged today. PROPHYLAXIS:  Stress ulcer: none    DVT PROPHYLAXIS:  - SCD sleeves - Thigh High   - Lovenox    DISPOSITION:  [x] Possible discharge today vs transfer to stepdown under general neurology    We will continue to follow along. For any changes in exam or patient status please contact Neuro Critical Care.       Thea Yip,   Neuro Critical Care  Pager [phone]  1/31/2021     6:12 AM

## 2021-01-31 NOTE — PROGRESS NOTES
Physical Therapy  Facility/Department: [address]  Daily Treatment Note  NAME: Angela Thompson  : 1960  MRN: [id number]    Date of Service: 2021    Discharge Recommendations:    would benefit from continuued PT after DC to work on endurance. Assessment   Assessment: Incrased AMB today, 150 feet HHA with SBA for safety. No LOB. no unsteadiness noted today. Poor overal posture. Sit to stand transfers with SBA. Pt would benefit from continuued PT after DC to work on endurance. Prognosis: Good  PT Education: Goals;PT Role;Plan of Care;General Safety;Transfer Training;Gait Training  Activity Tolerance  Activity Tolerance: Patient limited by fatigue     Patient Diagnosis(es): There were no encounter diagnoses. has a past medical history of BPH (benign prostatic hyperplasia) and Schizophrenia ([facility] [address] has no past surgical history on file. Restrictions  Restrictions/Precautions  Restrictions/Precautions: Fall Risk  Required Braces or Orthoses?: No  Subjective   General  Response To Previous Treatment: Patient with no complaints from previous session. Family / Caregiver Present: No  Subjective  Subjective: Pt sitting up in chair. Pt agreeable to PT. Polite and cooperative. General Comment  Comments: Denies use of assistive device at home. Orientation  Orientation  Overall Orientation Status: Impaired  Cognition      Objective      Transfers  Sit to Stand: Supervision  Stand to sit: Supervision  Ambulation 1  Surface: level tile  Device: No Device  Assistance: Stand by assistance  Gait Deviations: Increased CARLTON  Distance: 150 feet x1, HHA for safety  Comments: No LOB. Increased CARLTON and large steps. Flexed posture. Pt demonstrated increased pace today. Slight SOB on room air.  SpO2% after AMB was 96%             Goals  Short term goals  Time Frame for Short term goals: 15  Short term goal 1: Pt to perform bed mobility independently  Short term goal 2: Demonstrate functional transfers SBA  Short term goal 3: Ambulate 300ft w/ least restrictive AD demonstrating increased safety awareness throughout  Short term goal 4: Ascend/descend 4 stairs with one handrail to simulate home environment SBA  Patient Goals   Patient goals : Did not state    Plan    Plan  Times per week: 5-6x/week  Current Treatment Recommendations: Strengthening, Transfer Training, Endurance Training, Patient/Caregiver Education & Training, ROM, Balance Training, Gait Training, Home Exercise Program, Functional Mobility Training, Stair training, Safety Education & Training  Safety Devices  Type of devices: Patient at risk for falls, Call light within reach, Left in chair, Positioning belt, Gait belt, Nurse notified, Chair alarm in place  Restraints  Initially in place: No     Therapy Time   Individual Concurrent Group Co-treatment   Time In 1458         Time Out 1515         Minutes [address] [place] [address]

## 2021-02-01 VITALS
WEIGHT: 160.27 LBS | RESPIRATION RATE: 19 BRPM | HEART RATE: 80 BPM | BODY MASS INDEX: 25.76 KG/M2 | DIASTOLIC BLOOD PRESSURE: 84 MMHG | HEIGHT: 66 IN | OXYGEN SATURATION: 93 % | SYSTOLIC BLOOD PRESSURE: 160 MMHG | TEMPERATURE: 98.6 F

## 2021-02-01 PROBLEM — N30.90 CYSTITIS: Status: ACTIVE | Noted: 2021-02-01

## 2021-02-01 LAB
ABSOLUTE EOS #: 0.26 K/UL (ref 0–0.44)
ABSOLUTE IMMATURE GRANULOCYTE: 0.03 K/UL (ref 0–0.3)
ABSOLUTE LYMPH #: 1.73 K/UL (ref 1.1–3.7)
ABSOLUTE MONO #: 0.84 K/UL (ref 0.1–1.2)
ANION GAP SERPL CALCULATED.3IONS-SCNC: 9 MMOL/L (ref 9–17)
ANTI DNA DOUBLE STRANDED: 7 IU/ML
ANTI SSA: 5 U/ML
ANTI SSB: 6 U/ML
ANTI-NUCLEAR ANTIBODY (ANA): NEGATIVE
ANTI-SCLERODERMA: 54 U/ML
BASOPHILS # BLD: 1 % (ref 0–2)
BASOPHILS ABSOLUTE: 0.05 K/UL (ref 0–0.2)
BUN BLDV-MCNC: 12 MG/DL (ref 8–23)
BUN/CREAT BLD: ABNORMAL (ref 9–20)
CALCIUM SERPL-MCNC: 8.9 MG/DL (ref 8.6–10.4)
CHLORIDE BLD-SCNC: 104 MMOL/L (ref 98–107)
CO2: 27 MMOL/L (ref 20–31)
CREAT SERPL-MCNC: 0.86 MG/DL (ref 0.7–1.2)
CULTURE: NORMAL
DIFFERENTIAL TYPE: ABNORMAL
DIRECT EXAM: NORMAL
DIRECT EXAM: NORMAL
EOSINOPHILS RELATIVE PERCENT: 4 % (ref 1–4)
GFR AFRICAN AMERICAN: >60 ML/MIN
GFR NON-AFRICAN AMERICAN: >60 ML/MIN
GFR SERPL CREATININE-BSD FRML MDRD: ABNORMAL ML/MIN/{1.73_M2}
GFR SERPL CREATININE-BSD FRML MDRD: ABNORMAL ML/MIN/{1.73_M2}
GLUCOSE BLD-MCNC: 115 MG/DL (ref 70–99)
HCT VFR BLD CALC: 43.9 % (ref 40.7–50.3)
HEMOGLOBIN: 13.8 G/DL (ref 13–17)
IMMATURE GRANULOCYTES: 1 %
LYMPHOCYTES # BLD: 26 % (ref 24–43)
Lab: NORMAL
MAGNESIUM: 2 MG/DL (ref 1.6–2.6)
MCH RBC QN AUTO: 27.9 PG (ref 25.2–33.5)
MCHC RBC AUTO-ENTMCNC: 31.4 G/DL (ref 28.4–34.8)
MCV RBC AUTO: 88.9 FL (ref 82.6–102.9)
MONOCYTES # BLD: 13 % (ref 3–12)
NRBC AUTOMATED: 0 PER 100 WBC
PDW BLD-RTO: 13.3 % (ref 11.8–14.4)
PLATELET # BLD: 276 K/UL (ref 138–453)
PLATELET ESTIMATE: ABNORMAL
PMV BLD AUTO: 11.6 FL (ref 8.1–13.5)
POTASSIUM SERPL-SCNC: 4.1 MMOL/L (ref 3.7–5.3)
RBC # BLD: 4.94 M/UL (ref 4.21–5.77)
RBC # BLD: ABNORMAL 10*6/UL
SEG NEUTROPHILS: 55 % (ref 36–65)
SEGMENTED NEUTROPHILS ABSOLUTE COUNT: 3.69 K/UL (ref 1.5–8.1)
SODIUM BLD-SCNC: 140 MMOL/L (ref 135–144)
SPECIMEN DESCRIPTION: NORMAL
WBC # BLD: 6.6 K/UL (ref 3.5–11.3)
WBC # BLD: ABNORMAL 10*3/UL

## 2021-02-01 PROCEDURE — 36415 COLL VENOUS BLD VENIPUNCTURE: CPT

## 2021-02-01 PROCEDURE — 99231 SBSQ HOSP IP/OBS SF/LOW 25: CPT | Performed by: PSYCHIATRY & NEUROLOGY

## 2021-02-01 PROCEDURE — 97530 THERAPEUTIC ACTIVITIES: CPT

## 2021-02-01 PROCEDURE — 2580000003 HC RX 258: Performed by: STUDENT IN AN ORGANIZED HEALTH CARE EDUCATION/TRAINING PROGRAM

## 2021-02-01 PROCEDURE — 97116 GAIT TRAINING THERAPY: CPT

## 2021-02-01 PROCEDURE — 83735 ASSAY OF MAGNESIUM: CPT

## 2021-02-01 PROCEDURE — 99253 IP/OBS CNSLTJ NEW/EST LOW 45: CPT | Performed by: PHYSICAL MEDICINE & REHABILITATION

## 2021-02-01 PROCEDURE — 80048 BASIC METABOLIC PNL TOTAL CA: CPT

## 2021-02-01 PROCEDURE — 6360000002 HC RX W HCPCS: Performed by: STUDENT IN AN ORGANIZED HEALTH CARE EDUCATION/TRAINING PROGRAM

## 2021-02-01 PROCEDURE — 6360000002 HC RX W HCPCS: Performed by: PSYCHIATRY & NEUROLOGY

## 2021-02-01 PROCEDURE — 85025 COMPLETE CBC W/AUTO DIFF WBC: CPT

## 2021-02-01 PROCEDURE — 2580000003 HC RX 258: Performed by: PSYCHIATRY & NEUROLOGY

## 2021-02-01 PROCEDURE — 6370000000 HC RX 637 (ALT 250 FOR IP): Performed by: STUDENT IN AN ORGANIZED HEALTH CARE EDUCATION/TRAINING PROGRAM

## 2021-02-01 RX ORDER — CEPHALEXIN 500 MG/1
500 CAPSULE ORAL 4 TIMES DAILY
Qty: 28 CAPSULE | Refills: 0 | Status: SHIPPED | OUTPATIENT
Start: 2021-02-01 | End: 2021-02-03

## 2021-02-01 RX ORDER — AMLODIPINE BESYLATE 5 MG/1
5 TABLET ORAL DAILY
Qty: 30 TABLET | Refills: 0 | Status: SHIPPED | OUTPATIENT
Start: 2021-02-02 | End: 2021-02-25 | Stop reason: CLARIF

## 2021-02-01 RX ORDER — DIVALPROEX SODIUM 250 MG/1
750 TABLET, EXTENDED RELEASE ORAL NIGHTLY
Qty: 30 TABLET | Refills: 3 | Status: CANCELLED | OUTPATIENT
Start: 2021-02-01

## 2021-02-01 RX ADMIN — SODIUM CHLORIDE, PRESERVATIVE FREE 10 ML: 5 INJECTION INTRAVENOUS at 09:57

## 2021-02-01 RX ADMIN — CEFTRIAXONE SODIUM 1000 MG: 1 INJECTION, POWDER, FOR SOLUTION INTRAMUSCULAR; INTRAVENOUS at 05:48

## 2021-02-01 RX ADMIN — ENOXAPARIN SODIUM 40 MG: 40 INJECTION SUBCUTANEOUS at 09:58

## 2021-02-01 RX ADMIN — AMLODIPINE BESYLATE 5 MG: 5 TABLET ORAL at 09:58

## 2021-02-01 RX ADMIN — ARIPIPRAZOLE 10 MG: 10 TABLET ORAL at 09:58

## 2021-02-01 NOTE — DISCHARGE SUMMARY
Neuro Critical Care   Discharge Summary      PATIENT NAME: Kathrin Schreiber  YOB: 1960  MEDICAL RECORD NO. [id number]  DATE: 2/1/2021  PRIMARY CARE PHYSICIAN: Martinez Rao MD  DISCHARGE DATE:  2/1/2021  DISCHARGE DIAGNOSIS:   Patient Active Problem List   Diagnosis Code    Status epilepticus ([facility] [address] G40.901    Ventilator dependence ([facility] [address] Z99.11    Encephalopathy G93.40    Cystitis N30.90       Wesley Deras is a 61 y.o. yo male who presented to [facility] on 1/28/2021  5:49 AM with seizure. Initial Presentation (Admitted 1/28/21): The patient is a 57 y. o. male with history of schizoaffective disorder on Depakote, presented with seizures.  Patient was found unresponsive by wife, called EMS, found to have generalized tonic-clonic seizures outlying facility, intubated, sent to [facility]"" [facility] for further evaluation. Michele Bravo has no history of seizures, is on Depakote for schizoaffective disorder.  Patient was found to be positive for cocaine on urine drug screen.  CT head was unremarkable.  Patient was loaded with Keppra, Depakote.  There was also concern for pneumonia, patient was started on vancomycin and [address] Course:   1/28: During assessment today, patient was been sedated with propofol being intubated. Patient had MRI that was negative for any concerning findings. Ultimately as the pain found patient to be in moderate to severe encephalopathy with no signs of seizure activity. 1/29: Overnight patient had no urine output concerns for possible misplacement of the Aquino balloon. Bedside ultrasound revealed the balloon was out of position and urology was consulted and replaced the Aquino. Patient then had good urine output. Urinalysis revealed a UTI was started on Rocephin. PLumbar puncture was performed at bedside this morning by Dr. Diana Foreman. Extubated and following CSF labs. 1/30: Pt tolerating PO.   Exam is clinically stable.       Last 24h:   No acute vents overnight. Hemodynamically stable. Afebrile. Patient tolerating p.o. intake without difficulty. Pt is walking around without difficulty. Patient passed void trial from urology. Patient refused acute rehab meds to be home with his wife agreed. Labs and imaging were followed daily. At time of discharge, Andrew Roblero was tolerating a DIET GENERAL;, having bowel movements, and is in stable condition to be discharged to home. PROCEDURES:    Lumbar puncture    PHYSICAL EXAMINATION        Discharge Vitals:  height is 5' 6\" (1.676 m) and weight is 160 lb 4.4 oz (72.7 kg). His oral temperature is 98.6 °F (37 °C). His blood pressure is 160/84 (abnormal) and his pulse is 80. His respiration is 19 and oxygen saturation is 93%. CONSTITUTIONAL:  Well developed, well nourished, alert and oriented x 3, in no acute distress. GCS 15. Nontoxic. No dysarthria. No aphasia.    HEAD:  normocephalic, atraumatic    EYES:  PERRLA, EOMI.   ENT:  moist mucous membranes   NECK:  supple, symmetric   LUNGS:  Equal air entry bilaterally   CARDIOVASCULAR:  normal s1 / s2, RRR, distal pulses intact   ABDOMEN:  Soft, no rigidity   NEUROLOGIC:  Mental Status:  A & O x3,awake             Cranial Nerves:    cranial nerves II-XII are grossly intact    Motor Exam:    Drift:  absent  Tone:  normal    Motor exam is symmetrical 5 out of 5 all extremities bilaterally    Sensory:    Touch:    Right Upper Extremity:  normal  Left Upper Extremity:  normal  Right Lower Extremity:  normal  Left Lower Extremity:  normal    Deep TendoGait:  normal         LABS/IMAGING     Recent Labs     01/30/21  0407 01/31/21  0504 02/01/21  0340   WBC 6.4 6.2 6.6   HGB 11.7* 12.4* 13.8   HCT 37.8* 40.0* 43.9    228 276    139 140   K 4.1 3.7 4.1   * 108* 104   CO2 23 23 27   BUN 13 14 12   CREATININE 0.72 0.76 0.86       Xr Chest Portable    Result Date: 1/28/2021  EXAMINATION: ONE XRAY VIEW OF THE CHEST 1/28/2021 8:46 am COMPARISON: None. HISTORY: ORDERING SYSTEM PROVIDED HISTORY: intubated TECHNOLOGIST PROVIDED HISTORY: intubated Reason for Exam: Supine port chest FINDINGS: Endotracheal tube with the tip 4.4 cm above the level of the smooth. Enteric tube extends beyond the gastroesophageal junction. No focal consolidation. Mild interstitial prominence. No cardiomegaly. Mild interstitial prominence may be related to mild pulmonary edema versus an atypical infection. Mri Brain With And Without Contrast    Result Date: 1/28/2021  EXAMINATION: MRI OF THE BRAIN WITHOUT AND WITH CONTRAST  1/28/2021 3:49 pm TECHNIQUE: Multiplanar multisequence MRI of the head/brain was performed without and with the administration of intravenous contrast. COMPARISON: None. HISTORY: ORDERING SYSTEM PROVIDED HISTORY: first time seizures TECHNOLOGIST PROVIDED HISTORY: first time seizures Initial evaluation. FINDINGS: INTRACRANIAL STRUCTURES/VENTRICLES:  There is no acute infarct. The hippocampal structures are symmetric. No abnormal T2 FLAIR signal within the medial temporal lobes. No mass effect or midline shift. No evidence of an acute intracranial hemorrhage. Areas of T2 FLAIR hyperintensity are seen in the periventricular and subcortical white matter, which are nonspecific, but may represent chronic microvascular ischemic change. There is mild global parenchymal volume loss. The sellar/suprasellar regions appear unremarkable. The normal signal voids within the major intracranial vessels appear maintained. No abnormal focus of enhancement is seen within the brain. ORBITS: The visualized portion of the orbits demonstrate no acute abnormality. SINUSES: Scattered mucosal thickening of the paranasal sinuses. Trace mastoid effusions, right greater than. BONES/SOFT TISSUES: The bone marrow signal intensity appears normal. The soft tissues demonstrate no acute abnormality. 1. No acute intracranial abnormality. No acute infarct.  2. Mild global parenchymal volume loss with chronic microvascular ischemic changes. 3. Scattered mucosal thickening of the paranasal sinuses. 4. Trace mastoid effusions. DISCHARGE INSTRUCTIONS     Discharge Medications:        Medication List      STOP taking these medications    diphenhydrAMINE 25 MG capsule  Commonly known as: BENADRYL     hydrOXYzine 25 MG tablet  Commonly known as: ATARAX     traZODone 100 MG tablet  Commonly known as: DESYREL        ASK your doctor about these medications    Abilify Maintena 400 MG Prsy  Generic drug: ARIPiprazole ER  Ask about: Which instructions should I use? Depakote  MG extended release tablet  Generic drug: divalproex     QUEtiapine 300 MG extended release tablet  Commonly known as: SEROQUEL XR  Ask about: Which instructions should I use?           Diet: DIET GENERAL; diet as tolerated  Activity: Normal activity  Follow-up:  Neurology, PCP  Time Spent for discharge: 30 minutes    Luther Jiang DO  Neuro Critical Care  2/1/2021, 11:56 AM

## 2021-02-01 NOTE — PROGRESS NOTES
Daily Progress Note  Neuro Critical Care    Patient Name: Luke Perry  Patient : 1960  Room/Bed: 0676/8074-50  Code Status: Full code  Allergies: No Known Allergies    CHIEF COMPLAINT:      Seizure     INTERVAL HISTORY    Initial Presentation (Admitted 21): The patient is a 61 y.o. male with history of schizoaffective disorder on Depakote, presented with seizures. Patient was found unresponsive by wife, called EMS, found to have generalized tonic-clonic seizures outlying facility, intubated, sent to [facility] for further evaluation. Patient has no history of seizures, is on Depakote for schizoaffective disorder. Patient was found to be positive for cocaine on urine drug screen. CT head was unremarkable. Patient was loaded with Keppra, Depakote. There was also concern for pneumonia, patient was started on vancomycin and Zosyn. Hospital Course:   : During assessment today, patient was been sedated with propofol being intubated. Patient had MRI that was negative for any concerning findings. Ultimately as the pain found patient to be in moderate to severe encephalopathy with no signs of seizure activity. : Overnight patient had no urine output concerns for possible misplacement of the Aquino balloon. Bedside ultrasound revealed the balloon was out of position and urology was consulted and replaced the Aquino. Patient then had good urine output. Urinalysis revealed a UTI was started on Rocephin. PLumbar puncture was performed at bedside this morning by Dr. Danyelle Calixto. Extubated and following CSF labs. : Pt tolerating PO. Exam is clinically stable. Last 24h:   No acute vents overnight. Hemodynamically stable. Afebrile. Patient tolerating p.o. intake without difficulty. Pt is walking around without difficulty. Possible void trial before being discharged home today.     CURRENT MEDICATIONS:  SCHEDULED MEDICATIONS:   divalproex  750 mg Oral Nightly    amLODIPine  5 mg Oral Daily    cefTRIAXone (ROCEPHIN) IV  1,000 mg Intravenous Q24H    QUEtiapine  100 mg Oral Nightly    ARIPiprazole  10 mg Oral Daily    sodium chloride flush  10 mL Intravenous 2 times per day    enoxaparin  40 mg Subcutaneous Daily     CONTINUOUS INFUSIONS:    PRN MEDICATIONS:   labetalol, sennosides-docusate sodium, melatonin, sodium chloride flush, promethazine **OR** ondansetron, polyethylene glycol, acetaminophen **OR** acetaminophen, sodium chloride flush    VITALS:  Temperature Range: Temp: 98.6 °F (37 °C) Temp  Av.5 °F (36.9 °C)  Min: 98.2 °F (36.8 °C)  Max: 98.9 °F (37.2 °C)  BP Range: Systolic (85NUT), TJS:611 , Min:154 , BEREKET:358     Diastolic (76LJP), LEQ:04, Min:65, Max:87    Pulse Range: Pulse  Av.5  Min: 71  Max: 87  Respiration Range: Resp  Av.3  Min: 14  Max: 26  Current Pulse Ox: SpO2: 96 %  24HR Pulse Ox Range: SpO2  Av.3 %  Min: 91 %  Max: 100 %  Patient Vitals for the past 12 hrs:   BP Temp Temp src Pulse Resp SpO2 Weight   21 0400 (!) 160/84 98.6 °F (37 °C) Oral 76 17 96 % 160 lb 4.4 oz (72.7 kg)   21 0000 (!) 157/73 98.5 °F (36.9 °C) Oral 71 16 95 % --   21 2000 (!) 154/87 98.6 °F (37 °C) Oral 80 19 96 % --     Estimated body mass index is 25.87 kg/m² as calculated from the following:    Height as of this encounter: 5' 6\" (1.676 m).     Weight as of this encounter: 160 lb 4.4 oz (72.7 kg).  []<16 Severe malnutrition  []16-16.99 Moderate malnutrition  []17-18.49 Mild malnutrition  []18.5-24.9 Normal  [x]25-29.9 Overweight (not obese)  []30-34.9 Obese class 1 (Low Risk)  []35-39.9 Obese class 2 (Moderate Risk)  []?40 Obese class 3 (High Risk)    RECENT LABS:   Lab Results   Component Value Date    WBC 6.6 2021    HGB 13.8 2021    HCT 43.9 2021     2021    ALT 13 2021    AST 29 2021     2021    K 4.1 2021     2021    CREATININE 0.86 2021    BUN 12 2021    CO2 27 02/01/2021    INR 1.0 01/28/2021     24 HOUR INTAKE/OUTPUT:    Intake/Output Summary (Last 24 hours) at 2/1/2021 4546  Last data filed at 2/1/2021 0500  Gross per 24 hour   Intake 65 ml   Output 3200 ml   Net -3135 ml       IMAGING:   Xr Chest Portable    Result Date: 1/28/2021  EXAMINATION: ONE XRAY VIEW OF THE CHEST 1/28/2021 8:46 am COMPARISON: None. HISTORY: ORDERING SYSTEM PROVIDED HISTORY: intubated TECHNOLOGIST PROVIDED HISTORY: intubated Reason for Exam: Supine port chest FINDINGS: Endotracheal tube with the tip 4.4 cm above the level of the smooth. Enteric tube extends beyond the gastroesophageal junction. No focal consolidation. Mild interstitial prominence. No cardiomegaly. Mild interstitial prominence may be related to mild pulmonary edema versus an atypical infection. Mri Brain With And Without Contrast    Result Date: 1/28/2021  EXAMINATION: MRI OF THE BRAIN WITHOUT AND WITH CONTRAST  1/28/2021 3:49 pm TECHNIQUE: Multiplanar multisequence MRI of the head/brain was performed without and with the administration of intravenous contrast. COMPARISON: None. HISTORY: ORDERING SYSTEM PROVIDED HISTORY: first time seizures TECHNOLOGIST PROVIDED HISTORY: first time seizures Initial evaluation. FINDINGS: INTRACRANIAL STRUCTURES/VENTRICLES:  There is no acute infarct. The hippocampal structures are symmetric. No abnormal T2 FLAIR signal within the medial temporal lobes. No mass effect or midline shift. No evidence of an acute intracranial hemorrhage. Areas of T2 FLAIR hyperintensity are seen in the periventricular and subcortical white matter, which are nonspecific, but may represent chronic microvascular ischemic change. There is mild global parenchymal volume loss. The sellar/suprasellar regions appear unremarkable. The normal signal voids within the major intracranial vessels appear maintained. No abnormal focus of enhancement is seen within the brain.  ORBITS: The visualized portion of the orbits demonstrate no acute abnormality. SINUSES: Scattered mucosal thickening of the paranasal sinuses. Trace mastoid effusions, right greater than. BONES/SOFT TISSUES: The bone marrow signal intensity appears normal. The soft tissues demonstrate no acute abnormality. 1. No acute intracranial abnormality. No acute infarct. 2. Mild global parenchymal volume loss with chronic microvascular ischemic changes. 3. Scattered mucosal thickening of the paranasal sinuses. 4. Trace mastoid effusions. Labs and Images reviewed with:  [] Dr. Junior Pittman. Evita    [x] Dr. Winsome Valdez  [] Dr. Michelle Vail  [] There are no new interval images to review. PHYSICAL EXAM       CONSTITUTIONAL:  Well developed, well nourished, intubated, following commands   HEAD:  normocephalic, atraumatic    EYES:  PERRLA, EOMI.   ENT:  moist mucous membranes, intubated   NECK:  supple, symmetric   LUNGS:  Equal air entry bilaterally   CARDIOVASCULAR:  normal s1 / s2, RRR, distal pulses intact   ABDOMEN:  Soft, no rigidity   NEUROLOGIC:  Cranial Nerves:    III: Pupils:  equal, round, reactive to light  III,IV,VI: Extra Ocular Movements: intact  V: Facial sensation:  intact  VII: Facial strength: intact  VIII: Hearing:  intact  IX: Palate:  intact  XI: Shoulder shrug:  intact  XII: Tongue movement:  normal    Motor Exam:    Drift:  absent  Tone:  normal    Motor exam is symmetrical 5 out of 5 all extremities bilaterally    Sensory:    Touch:    Right Upper Extremity:  normal  Left Upper Extremity:  normal  Right Lower Extremity:  normal  Left Lower Extremity:  normal           ASSESSMENT AND PLAN:       58-year-old male presents with a history of schizoaffective disorder on Depakote as a mood stabilizer, presents for first-time seizures and intubated sedated on propofol.     NEUROLOGIC:  - Imaging    - CT Head at OSH revealed no acute intracranial process but did note mild old microvascular ischemic changes and age-related cerebral atrophy.   - MRI Brain was revealed global parenchymal volume loss and microvascular ischemic changes. No signs of infarct or any other abnormalities. - AEDs    -Stopped Keppra  - Goal SBP normotensive  - Await results of CFS panel  - Neuro checks per protocol    CARDIOVASCULAR:  - Goal SBP normotensive  - Continue telemetry    PULMONARY:  - On RA  - encourage IS    RENAL/FLUID/ELECTROLYTE:  - BUN 12/ Creatinine 0.86  - Urine output 1.8 ml/kg/hr  - Traumatic noguera placement   -Urology placed Noguera at bedside   -Possible void trial per urology  - Replace electrolytes PRN  - Daily BMP    GI/NUTRITION:  NUTRITION:  DIET GENERAL;  - Bowel regimen: senokot-s and GlycoLax as needed  - GI prophylaxis: none    ID:  -Afebrile  - WBC 6.2  - UTI   - + Nitrites/mod. Leukocyte esterase/10-20 WBCs   - Rocephin 1g QD x4/5 days  - Continue to monitor for fevers  - Daily CBC    HEME:   - Hgb 13.8  - Platelets 019  - Daily CBC    ENDOCRINE:  - Continue to monitor blood glucose, goal <180      OTHER:  - PT/OT  - Code Status: Full  -To get up and walk around if he wants to be discharged today. PROPHYLAXIS:  Stress ulcer: none    DVT PROPHYLAXIS:  - SCD sleeves - Thigh High   - Lovenox    DISPOSITION:  [x] Possible discharge today vs transfer to stepdown under general neurology    We will continue to follow along. For any changes in exam or patient status please contact Neuro Critical Care.       Reji Neri DO  Neuro Critical Care  Pager [phone]  2/1/2021     6:43 AM

## 2021-02-01 NOTE — PROGRESS NOTES
Pt discharged to private car via wheelchair. Steady gait noted upon discharge. Skin is warm pink and dry. Pt has no complaints. Accompanied by wife.

## 2021-02-01 NOTE — PROGRESS NOTES
Physical Therapy  Facility/Department: [address]  Daily Treatment Note  NAME: Melo Jackson  : 1960  MRN: [id number]    Date of Service: 2021    Discharge Recommendations:  Patient would benefit from continued therapy after discharge   PT Equipment Recommendations  Equipment Needed: (Continue to assess pending progression of mobility- pt currently unsafe to perform functional mobility unassisted)    Assessment   Body structures, Functions, Activity limitations: Decreased functional mobility ; Decreased posture;Decreased cognition;Decreased endurance;Decreased ROM; Decreased strength;Decreased balance;Decreased safe awareness  Assessment: Increased steadiness with RW but not safe to ambulate w/o physical assist d/t decreased safety, R side neglect and lack of awareness of deficits. Pt demonstrates significant decrease safety awareness and impulsivness and is a high fall risk at this time. Recommending continued skilled physical therapy to address functional mobility deficits, Pt not safe to return to PLOF at this time . Prognosis: Good  Decision Making: Medium Complexity  Barriers to Learning: Pt's cognition  REQUIRES PT FOLLOW UP: Yes  Activity Tolerance  Activity Tolerance: Patient limited by cognitive status     Patient Diagnosis(es): There were no encounter diagnoses. has a past medical history of BPH (benign prostatic hyperplasia) and Schizophrenia ([facility] [address] has no past surgical history on file. Restrictions  Restrictions/Precautions  Restrictions/Precautions: Fall Risk  Required Braces or Orthoses?: No  Subjective   General  Response To Previous Treatment: Patient with no complaints from previous session.   Family / Caregiver Present: No  Subjective  Subjective: Rn and pt agreed to PT, pt awake in bed upon arrival and anxious to get up and ambulate, pt denies pain  General Comment  Comments: Denies using AD at home  Pain Screening  Patient Currently in Pain: Denies  Vital Signs  Patient Currently in Pain: Denies       Orientation  Orientation  Overall Orientation Status: Impaired  Orientation Level: Oriented to person;Oriented to place; Disoriented to time;Disoriented to situation  Cognition   Impaired     Objective   Bed mobility  Supine to Sit: Stand by assistance  Sit to Supine: Stand by assistance  Scooting: Stand by assistance  Comment: Pt demo's impulsiveness and needs cues for safety; pt initiated standing prior to writer's cues and had bedding wrapped around his foot. Transfers  Sit to Stand: Stand by assistance  Stand to sit: Stand by assistance  Comment: Impulsive, needs cues for safety  Ambulation  More Ambulation?: Yes  Ambulation 1  Surface: level tile  Device: No Device  Assistance: Contact guard assistance;Minimal assistance  Quality of Gait: Increased pace, flexed posture, flexed knees, unsteady  Gait Deviations: Increased CARLTON; Decreased step height;Staggers, R side neglect  Distance: 175'  Ambulation 2  Surface - 2: level tile  Device 2: [address] 2: Contact guard assistance  Quality of Gait 2: Increased pace especially with directional changes, decreased safety and awareness of surroundings, R side neglect, very impulsive and running AD into objects in his path  Gait Deviations: Staggers; Increased CARLTON  Distance: 220'  Stairs/Curb  Stairs?: No(Not safe to attempt d/t impulsiveness)     Balance  Posture: Fair  Sitting - Static: Good  Sitting - Dynamic: Good;-  Standing - Static: Fair  Standing - Dynamic: Fair;-  Comments: Anterior and lateral stepping x 10 CGA to Dana, unsteady and impulsive  Exercises  Standing marches and heel raised with single UE support CGA x 10 , pt unsteady w/o UE assist                   Goals  Short term goals  Time Frame for Short term goals: 14  Short term goal 1: Pt to perform bed mobility independently  Short term goal 2: Demonstrate functional transfers SBA  Short term goal 3: Ambulate 300ft w/ least restrictive AD demonstrating

## 2021-02-01 NOTE — PLAN OF CARE
Problem: Falls - Risk of:  Goal: Will remain free from falls  Description: Will remain free from falls  1/31/2021 2125 by Mariel Quinonez RN  Outcome: Ongoing  1/31/2021 1542 by Raisa Alegria RN  Outcome: Ongoing  Goal: Absence of physical injury  Description: Absence of physical injury  1/31/2021 2125 by Mariel Quinonez RN  Outcome: Ongoing  1/31/2021 1542 by Raisa Alegria RN  Outcome: Ongoing

## 2021-02-01 NOTE — CONSULTS
Bed mobility  Supine to Sit: Stand by assistance  Sit to Supine: Stand by assistance  Scooting: Stand by assistance  Comment: Pt benson's impulsiveness and needs cues for safety                    SLP:      Past Medical History:        Diagnosis Date    BPH (benign prostatic hyperplasia)     Schizophrenia (HCC)        Past Surgical History:    No past surgical history on file. Allergies:    No Known Allergies     Current Medications:   No current facility-administered medications for this encounter. Social History:  Lives With: Spouse  Type of Home: House  Home Layout: Two level  Home Access: Stairs to enter without rails  Entrance Stairs - Number of Steps: \"A few\"  Bathroom Shower/Tub: Tub/Shower unit  Bathroom Toilet: Standard  ADL Assistance: Independent  Homemaking Assistance: Independent  Homemaking Responsibilities: Yes(Shared with wife)  Ambulation Assistance: Independent(Pt independently ambulates with no AD at baseline)  Transfer Assistance: Independent  Active : No  Mode of Transportation: Family  Occupation: On disability  Leisure & Hobbies: Watching football  Additional Comments: Pt reports spouse is able to provide prn assist upon discharge.   Social History     Socioeconomic History    Marital status:      Spouse name: Not on file    Number of children: Not on file    Years of education: Not on file    Highest education level: Not on file   Occupational History    Not on file   Social Needs    Financial resource strain: Not on file    Food insecurity     Worry: Not on file     Inability: Not on file    Transportation needs     Medical: Not on file     Non-medical: Not on file   Tobacco Use    Smoking status: Current Every Day Smoker     Packs/day: 1.50     Years: 40.00     Pack years: 60.00     Types: Cigarettes    Smokeless tobacco: Never Used   Substance and Sexual Activity    Alcohol use: Not Currently    Drug use: Not Currently    Sexual activity: Not on file Lifestyle    Physical activity     Days per week: Not on file     Minutes per session: Not on file    Stress: Not on file   Relationships    Social connections     Talks on phone: Not on file     Gets together: Not on file     Attends [employer] service: Not on file     Active member of club or organization: Not on file     Attends meetings of clubs or organizations: Not on file     Relationship status: Not on file    Intimate partner violence     Fear of current or ex partner: Not on file     Emotionally abused: Not on file     Physically abused: Not on file     Forced sexual activity: Not on file   Other Topics Concern    Not on file   Social History Narrative    Not on file       Family History:   No family history on file. Diagnostics:    Xr Chest Portable     Result Date: 1/28/2021  EXAMINATION: ONE XRAY VIEW OF THE CHEST 1/28/2021 8:46 am COMPARISON: None. HISTORY: ORDERING SYSTEM PROVIDED HISTORY: intubated TECHNOLOGIST PROVIDED HISTORY: intubated Reason for Exam: Supine port chest FINDINGS: Endotracheal tube with the tip 4.4 cm above the level of the smooth. Enteric tube extends beyond the gastroesophageal junction. No focal consolidation. Mild interstitial prominence. No cardiomegaly.      Mild interstitial prominence may be related to mild pulmonary edema versus an atypical infection.      Mri Brain With And Without Contrast     Result Date: 1/28/2021  EXAMINATION: MRI OF THE BRAIN WITHOUT AND WITH CONTRAST  1/28/2021 3:49 pm TECHNIQUE: Multiplanar multisequence MRI of the head/brain was performed without and with the administration of intravenous contrast. COMPARISON: None. HISTORY: ORDERING SYSTEM PROVIDED HISTORY: first time seizures TECHNOLOGIST PROVIDED HISTORY: first time seizures Initial evaluation. FINDINGS: INTRACRANIAL STRUCTURES/VENTRICLES:  There is no acute infarct. The hippocampal structures are symmetric. No abnormal T2 FLAIR signal within the medial temporal lobes.   No mass effect or midline shift. No evidence of an acute intracranial hemorrhage. Areas of T2 FLAIR hyperintensity are seen in the periventricular and subcortical white matter, which are nonspecific, but may represent chronic microvascular ischemic change. There is mild global parenchymal volume loss. The sellar/suprasellar regions appear unremarkable. The normal signal voids within the major intracranial vessels appear maintained. No abnormal focus of enhancement is seen within the brain. ORBITS: The visualized portion of the orbits demonstrate no acute abnormality. SINUSES: Scattered mucosal thickening of the paranasal sinuses. Trace mastoid effusions, right greater than. BONES/SOFT TISSUES: The bone marrow signal intensity appears normal. The soft tissues demonstrate no acute abnormality.      1. No acute intracranial abnormality. No acute infarct. 2. Mild global parenchymal volume loss with chronic microvascular ischemic changes. 3. Scattered mucosal thickening of the paranasal sinuses. 4. Trace mastoid effusions. CBC:   Recent Labs     01/30/21  0407 01/31/21  0504 02/01/21  0340   WBC 6.4 6.2 6.6   RBC 4.17* 4.44 4.94   HGB 11.7* 12.4* 13.8   HCT 37.8* 40.0* 43.9   MCV 90.6 90.1 88.9   RDW 13.6 13.5 13.3    228 276     BMP:   Recent Labs     01/30/21  0407 01/31/21  0504 02/01/21  0340    139 140   K 4.1 3.7 4.1   * 108* 104   CO2 23 23 27   BUN 13 14 12   CREATININE 0.72 0.76 0.86   GLUCOSE 96 91 115*      HbA1c: No results found for: LABA1C  BNP: No results for input(s): BNP in the last 72 hours. PT/INR: No results for input(s): PROTIME, INR in the last 72 hours. APTT: No results for input(s): APTT in the last 72 hours. CARDIAC ENZYMES: No results for input(s): CKMB, CKMBINDEX, TROPONINT in the last 72 hours.     Invalid input(s): CKTOTAL;3  FASTING LIPID PANEL:No results found for: CHOL, HDL, TRIG  LIVER PROFILE: No results for input(s): AST, ALT, ALB, BILIDIR, BILITOT, ALKPHOS in the last 72 hours. Physical Exam:  BP (!) 160/84   Pulse 80   Temp 98.6 °F (37 °C) (Oral)   Resp 19   Ht 5' 6\" (1.676 m)   Wt 160 lb 4.4 oz (72.7 kg)   SpO2 93%   BMI 25.87 kg/m²     GEN: Well developed, well nourished, no acute distress. HEENT: NCAT, PERRL, EOMI, mucous membranes pink and moist. Impaired dentition  RESP: Lungs clear to auscultation. No rales or rhonchi. Respirations WNL and unlabored. CV: Regular rate rhythm. No murmurs, rubs, or gallops. ABD: soft, non-distended, non-tender. BS+ and equal.  NEURO: A&O x 3. Sensation intact to light touch. DTRs 2+. Dysarthria at baseline  MSK: Functional ROM. Muscle tone and bulk are normal bilaterally. Strength 5/5 key muscles all extremities  EXT: No calf tenderness to palpation or edema BLEs. SKIN: Warm dry and intact with good turgor. PSYCH: Mood WNL. Affect WNL. Appropriately interactive. Impression:  1. Encephalopathy  2. Status epilepticus: on Depakote  3. UTI: on Rocephin until 2/3  4. HTN: on amlodipine  5. Schizoaffective disorder: on Seroquel and Depakote, Abilify    Recommendations:    1. Diagnosis:  Status epilepticus  2. Therapy: Has no PT/OT needs  3. Medical Necessity: As above  4. Support: Lives with supportive spouse  5. Rehab Recommendation: Patient discharging home today. No current inpatient or outpatient PM&R needs. 6. DVT Prophylaxis: on Lovenox    It was my pleasure to evaluate Robbin Shock today. Please call with questions. Liset Pedro MD        This note is created with the assistance of a speech recognition program.  While intending to generate a document that actually reflects the content of the visit, the document can still have some errors including those of syntax and sound a like substitutions which may escape proof reading. In such instances, actual meaning can be extrapolated by contextual diversion.

## 2021-02-02 LAB
CSF ISOELECTRIC FOCUSING INTERPRETATION: NORMAL
OLIGOCLONAL BANDS NUMBER: NORMAL BANDS (ref 0–1)
OLIGOCLONAL BANDS: NEGATIVE
VDRL CSF SCREEN: NONREACTIVE

## 2021-02-03 LAB
HSV BY PCR: NOT DETECTED
HSV SOURCE: NORMAL

## 2021-02-04 LAB
CULTURE: NORMAL
CULTURE: NORMAL
Lab: NORMAL
Lab: NORMAL
SPECIMEN DESCRIPTION: NORMAL
SPECIMEN DESCRIPTION: NORMAL

## 2021-02-06 NOTE — PROGRESS NOTES
Physician Progress Note      Leobardo Jacobo  CSN #:                  [id number]  :                       1960  ADMIT DATE:       2021 5:49 AM  DISCH DATE:        2021 1:25 PM  RESPONDING  PROVIDER #:        William Riley 61 TEXT:    Patient admitted with new onset of seizures. Noted documentation of   pneumonia on H&P of  By Emigdio and notation of IV - Vancomycin and Zosyn. MAR dose not reflect those meds and only noted IV- Rocephin given for UTI and   NS . If possible, please document in progress notes and discharge summary if you   are evaluating and /or treating any of the following: The medical record reflects the following:  Risk Factors: new onset of seizures  Clinical Indicators:  general tonic clonic seizures, noted documentation of   concern for pneumonia and started on Iv Vancomycin, and Zosyn - through out   the PN. CXR- an atypical infection. needs cues for balance. Treatment: IV - NS 75, Rocephin,    thank you, Parmjit Cardoza, [email or web address]  Options provided:  -- Pneumonia confirmed  -- pneumonia ruled out  -- Other - I will add my own diagnosis  -- Disagree - Not applicable / Not valid  -- Disagree - Clinically unable to determine / Unknown  -- Refer to Clinical Documentation Reviewer    PROVIDER RESPONSE TEXT:    After study, pneumonia ruled out.     Query created by: Betty Summers on 2021 11:57 AM      Electronically signed by:  Virginie Cintron DO 2021 8:54 PM

## 2021-02-25 ENCOUNTER — OFFICE VISIT (OUTPATIENT)
Dept: NEUROLOGY | Age: 61
End: 2021-02-25
Payer: COMMERCIAL

## 2021-02-25 VITALS
BODY MASS INDEX: 26.24 KG/M2 | DIASTOLIC BLOOD PRESSURE: 66 MMHG | SYSTOLIC BLOOD PRESSURE: 118 MMHG | HEART RATE: 73 BPM | WEIGHT: 162.6 LBS

## 2021-02-25 DIAGNOSIS — G40.909 SEIZURE DISORDER (HCC): Primary | ICD-10-CM

## 2021-02-25 DIAGNOSIS — R47.1 DYSARTHRIA: ICD-10-CM

## 2021-02-25 DIAGNOSIS — F14.91 HISTORY OF COCAINE USE: ICD-10-CM

## 2021-02-25 DIAGNOSIS — F19.10 POLYSUBSTANCE ABUSE (HCC): ICD-10-CM

## 2021-02-25 DIAGNOSIS — R56.9 NEW ONSET SEIZURE (HCC): ICD-10-CM

## 2021-02-25 DIAGNOSIS — G93.40 ENCEPHALOPATHY: ICD-10-CM

## 2021-02-25 PROCEDURE — 1111F DSCHRG MED/CURRENT MED MERGE: CPT | Performed by: STUDENT IN AN ORGANIZED HEALTH CARE EDUCATION/TRAINING PROGRAM

## 2021-02-25 PROCEDURE — 3017F COLORECTAL CA SCREEN DOC REV: CPT | Performed by: STUDENT IN AN ORGANIZED HEALTH CARE EDUCATION/TRAINING PROGRAM

## 2021-02-25 PROCEDURE — 99215 OFFICE O/P EST HI 40 MIN: CPT | Performed by: STUDENT IN AN ORGANIZED HEALTH CARE EDUCATION/TRAINING PROGRAM

## 2021-02-25 PROCEDURE — G8419 CALC BMI OUT NRM PARAM NOF/U: HCPCS | Performed by: STUDENT IN AN ORGANIZED HEALTH CARE EDUCATION/TRAINING PROGRAM

## 2021-02-25 PROCEDURE — 4004F PT TOBACCO SCREEN RCVD TLK: CPT | Performed by: STUDENT IN AN ORGANIZED HEALTH CARE EDUCATION/TRAINING PROGRAM

## 2021-02-25 PROCEDURE — G8427 DOCREV CUR MEDS BY ELIG CLIN: HCPCS | Performed by: STUDENT IN AN ORGANIZED HEALTH CARE EDUCATION/TRAINING PROGRAM

## 2021-02-25 PROCEDURE — G8484 FLU IMMUNIZE NO ADMIN: HCPCS | Performed by: STUDENT IN AN ORGANIZED HEALTH CARE EDUCATION/TRAINING PROGRAM

## 2021-02-25 RX ORDER — LISINOPRIL 10 MG/1
TABLET ORAL
COMMUNITY
Start: 2021-02-03

## 2021-02-25 RX ORDER — AMITRIPTYLINE HYDROCHLORIDE 100 MG/1
TABLET, FILM COATED ORAL
COMMUNITY
Start: 2021-02-12

## 2021-02-25 RX ORDER — DIVALPROEX SODIUM 250 MG/1
500 TABLET, DELAYED RELEASE ORAL 2 TIMES DAILY
Qty: 90 TABLET | Refills: 3 | Status: SHIPPED | OUTPATIENT
Start: 2021-02-25

## 2021-02-25 ASSESSMENT — ENCOUNTER SYMPTOMS
COUGH: 0
PHOTOPHOBIA: 0
NAUSEA: 0
SHORTNESS OF BREATH: 0
ABDOMINAL DISTENTION: 0
VOMITING: 0
ABDOMINAL PAIN: 0
APNEA: 0
SINUS PAIN: 0
EYE PAIN: 0

## 2021-02-25 ASSESSMENT — VISUAL ACUITY: VA_NORMAL: 1

## 2021-02-25 NOTE — PATIENT INSTRUCTIONS
1. Start taking Depakote 500mg BID   2. Follow up with me in 2 months     Schedule a Vaccine  When you qualify to receive the vaccine, call the [facility] COVID-19 Vaccination Hotline to schedule your appointment or to get additional information about the [facility]) locations which are offering the COVID-19 vaccine. To be 94% effective, it's important that you receive two doses of one of the COVID-19 vaccines. -If you are receiving the Schrader Peter vaccine, your second shot will be scheduled as close to 21 days after the first shot as possible. -If you are receiving the Moderna vaccine, your second shot will be scheduled as close to 28 days after the first shot as possible. [facility]) COVID-19 Vaccination Hotline: [phone]    Links to [facility]) website and [facility] website:    [email or web address]    [email or web address]

## 2021-02-25 NOTE — PROGRESS NOTES
Component Value Date    WBC 6.6 02/01/2021    HGB 13.8 02/01/2021    HCT 43.9 02/01/2021    MCV 88.9 02/01/2021     02/01/2021       Past Medical History:   Diagnosis Date    BPH (benign prostatic hyperplasia)     Schizophrenia (HCC)         No past surgical history on file.      Social History     Socioeconomic History    Marital status:      Spouse name: Not on file    Number of children: Not on file    Years of education: Not on file    Highest education level: Not on file   Occupational History    Not on file   Social Needs    Financial resource strain: Not on file    Food insecurity     Worry: Not on file     Inability: Not on file    Transportation needs     Medical: Not on file     Non-medical: Not on file   Tobacco Use    Smoking status: Current Every Day Smoker     Packs/day: 1.50     Years: 40.00     Pack years: 60.00     Types: Cigarettes    Smokeless tobacco: Never Used   Substance and Sexual Activity    Alcohol use: Not Currently    Drug use: Not Currently    Sexual activity: Not on file   Lifestyle    Physical activity     Days per week: Not on file     Minutes per session: Not on file    Stress: Not on file   Relationships    Social connections     Talks on phone: Not on file     Gets together: Not on file     Attends Jehovah's witness service: Not on file     Active member of club or organization: Not on file     Attends meetings of clubs or organizations: Not on file     Relationship status: Not on file    Intimate partner violence     Fear of current or ex partner: Not on file     Emotionally abused: Not on file     Physically abused: Not on file     Forced sexual activity: Not on file   Other Topics Concern    Not on file   Social History Narrative    Not on file        Current Outpatient Medications   Medication Sig Dispense Refill    lisinopril (PRINIVIL;ZESTRIL) 10 MG tablet take 1 tablet by mouth once daily  amitriptyline (ELAVIL) 100 MG tablet take 1/2 to 1 tablet by mouth at bedtime      ARIPiprazole ER (ABILIFY MAINTENA) 400 MG PRSY Inject 400 mg into the muscle every 21 days       divalproex (DEPAKOTE ER) 250 MG extended release tablet Take 750 mg by mouth nightly      QUEtiapine (SEROQUEL XR) 300 MG extended release tablet Take 100 mg by mouth nightly        No current facility-administered medications for this visit. No Known Allergies     REVIEW OF SYSTEMS:     Review of Systems   Constitutional: Negative for activity change, appetite change, chills, fatigue and unexpected weight change. HENT: Negative for congestion, ear pain and sinus pain. Eyes: Negative for photophobia, pain and visual disturbance. Respiratory: Negative for apnea, cough and shortness of breath. Cardiovascular: Negative for chest pain, palpitations and leg swelling. Gastrointestinal: Negative for abdominal distention, abdominal pain, nausea and vomiting. Endocrine: Negative for cold intolerance and polydipsia. Genitourinary: Negative for difficulty urinating and flank pain. Musculoskeletal: Negative for arthralgias and myalgias. Skin: Negative for rash. Allergic/Immunologic: Negative for environmental allergies and food allergies. Neurological: Positive for weakness (Feels weak in the mornings bilateral lower extremities focal only). Negative for dizziness and headaches. Hematological: Negative for adenopathy. Psychiatric/Behavioral: Negative for agitation and behavioral problems. VITALS  BP (!) 151/81 (Site: Right Upper Arm, Position: Sitting, Cuff Size: Medium Adult)   Pulse 80   Wt 162 lb 9.6 oz (73.8 kg)   BMI 26.24 kg/m²        NEUROLOGICAL EXAMINATION:     Neurological Exam  Mental Status  Awake, alert and oriented to person, place and time. dysarthria present. Cranial Nerves  CN II: Visual acuity is normal. Visual fields full to confrontation. CN III, IV, VI: Extraocular movements intact bilaterally. Normal lids and orbits bilaterally. Pupils equal round and reactive to light bilaterally. CN V: Facial sensation is normal.  CN VII: Full and symmetric facial movement. CN VIII: Hearing is normal.  CN IX, X: Palate elevates symmetrically. Normal gag reflex. CN XI: Shoulder shrug strength is normal.  CN XII: Tongue midline without atrophy or fasciculations. Motor  Normal muscle bulk throughout. No fasciculations present. Normal muscle tone. No abnormal involuntary movements. Strength is 5/5 throughout all four extremities. Sensory  Sensation is intact to light touch, pinprick, vibration and proprioception in all four extremities. Reflexes  Deep tendon reflexes are 2+ and symmetric in all four extremities with downgoing toes bilaterally. Coordination  Finger-to-nose, rapid alternating movements and heel-to-shin normal bilaterally without dysmetria. Gait  Normal casual, toe, heel and tandem gait. ASSESSMENT / PLAN:   [facility] is a 61-year-old gentleman who was recently admitted to [address] [place] [address] neuro ICU 1/28/2021 for status epilepticus monitor on LTM EEG for 2 days found to have diffuse encephalopathy otherwise unremarkable. Patient also had MRI brain with and without those unremarkable for any findings that would explain his seizure. Assessment  1. New onset generalized seizure  2. Schizoaffective disorder  3.   History of polysubstance abuse    Plan  -Follow-up with me in 2 months  -Start taking Depakote 500 mg twice daily  -Consider repeating CSF studies on 2-month follow-up if patient's speech is still abnormal  -MRI brain with and without completed 1/29/2021 unremarkable seizure protocol  -2 days LTME showing generalized diffused slowing  -Reviewed CSF studies showing elevated protein otherwise unremarkable

## 2021-02-26 NOTE — PROGRESS NOTES
Attending Physician Statement:    I have discussed the case of Beckie Nuno, including pertinent history and exam findings with the resident. I have seen and examined the patient and the key elements of the encounter have been performed by me. I have reviewed medications, clinical laboratory, imaging and other diagnostic tests with the residents. I agree with the assessment, plan and orders as documented by the resident with changes made to the note as needed. Mr. Beckie Nuno is a 61 y.o. male was seen in the clinic for post hospital follow up. Patient was admitted on 1/28/2021, he was found unresponsive by his wife, EMS was called, patient was noted to have generalized tonic-clonic seizure activity at outlying facility, he was intubated for airway protection and was sent to [place] for further evaluation. Denies any prior history of seizures, patient was on Depakote for schizoaffective disorder. UDS was positive for cocaine. Patient was loaded with Keppra and Depakote on presentation. There was concern about pneumonia and was treated with Vanco and Zosyn. MRI brain 1/28/2021 did not show any acute findings. Showed mild global parenchymal volume loss consistent with chronic microvascular ischemic changes. LTM E for 2 days showed diffuse encephalopathy, no seizure activity. LTM he was discontinued on 1/29/2021  Patient was found to have UTI and was treated with Rocephin. LP was done 1/29/2021, CSF studies showed glucose 49, protein 139, WBC 0, RBC 0, absent xanthochromia. Oligoclonal bands negative. Meningitis panel was negative. Lyme's disease antibody CSF negative, VDRL negative along with HSV PCR.  CSF culture was negative. Autoimmune encephalitis panel was not ordered during the hospitalization. Patient was discharged on 2/1/2021 on Depakote 750 mg at nighttime. Since discharge from the hospital patient denies any recurrence of seizures or seizure-like activity or episodes of confusion or staring spells. He denies any new neurologic concerns during visit, continues to have dysarthric speech otherwise denies aphasia. He is compliant with his Depakote, currently taking 750 mg at nighttime. Denies any side effects from the medications. Denies any other concerns. Impression and Plan:     New onset seizure activity. Unclear etiology. MRI brain did not show any acute finding. LTM E for 2 days showed diffuse encephalopathy, no seizure activity. CSF studies were unremarkable except for elevated proteins 139. History of schizoaffective disorder on Depakote  History of polysubstance abuse, UDS was positive for cocaine      Plan  -Patient was on Depakote 750 mg at nighttime, considering his history of new onset seizure, will change the dose to 500 mg twice a day. -We will continue to monitor symptoms, if patient symptoms worsen or does not improve may consider repeating MRI brain and CSF studies. Autoimmune encephalitis panel was not ordered during the hospitalization.    -Seizure Precautions:   counseled the patient with regard to seizure precautions for injury prevention and reinforced their rationale. No driving for at least 6 months, no activity at dangerous heights, no operation of dangerous machinery, no baths, no swimming alone. Caution (preferably accompanied) with cooking or near fires. The patient expressed understanding.     -Patient has completed PT/OT/ST    - Follow up in the clinic with the resident in 2 months. - Instructed patient to call the clinic if symptoms worsen or develop any new symptoms.       Andrea Reddy MD 2/26/2021 9:03 AM    Neurology

## 2021-05-06 ENCOUNTER — OFFICE VISIT (OUTPATIENT)
Dept: NEUROLOGY | Age: 61
End: 2021-05-06
Payer: COMMERCIAL

## 2021-05-06 VITALS
WEIGHT: 162 LBS | SYSTOLIC BLOOD PRESSURE: 125 MMHG | HEIGHT: 66 IN | OXYGEN SATURATION: 89 % | BODY MASS INDEX: 26.03 KG/M2 | HEART RATE: 107 BPM | DIASTOLIC BLOOD PRESSURE: 74 MMHG

## 2021-05-06 DIAGNOSIS — G93.40 ENCEPHALOPATHY: ICD-10-CM

## 2021-05-06 DIAGNOSIS — G40.909 SEIZURE DISORDER (HCC): Primary | ICD-10-CM

## 2021-05-06 DIAGNOSIS — F19.10 POLYSUBSTANCE ABUSE (HCC): ICD-10-CM

## 2021-05-06 DIAGNOSIS — R47.1 DYSARTHRIA: ICD-10-CM

## 2021-05-06 DIAGNOSIS — R56.9 NEW ONSET SEIZURE (HCC): ICD-10-CM

## 2021-05-06 DIAGNOSIS — F14.91 HISTORY OF COCAINE USE: ICD-10-CM

## 2021-05-06 PROCEDURE — 4004F PT TOBACCO SCREEN RCVD TLK: CPT | Performed by: STUDENT IN AN ORGANIZED HEALTH CARE EDUCATION/TRAINING PROGRAM

## 2021-05-06 PROCEDURE — 3017F COLORECTAL CA SCREEN DOC REV: CPT | Performed by: STUDENT IN AN ORGANIZED HEALTH CARE EDUCATION/TRAINING PROGRAM

## 2021-05-06 PROCEDURE — G8427 DOCREV CUR MEDS BY ELIG CLIN: HCPCS | Performed by: STUDENT IN AN ORGANIZED HEALTH CARE EDUCATION/TRAINING PROGRAM

## 2021-05-06 PROCEDURE — G8419 CALC BMI OUT NRM PARAM NOF/U: HCPCS | Performed by: STUDENT IN AN ORGANIZED HEALTH CARE EDUCATION/TRAINING PROGRAM

## 2021-05-06 PROCEDURE — 99214 OFFICE O/P EST MOD 30 MIN: CPT | Performed by: STUDENT IN AN ORGANIZED HEALTH CARE EDUCATION/TRAINING PROGRAM

## 2021-05-06 ASSESSMENT — VISUAL ACUITY: VA_NORMAL: 1

## 2021-05-06 ASSESSMENT — ENCOUNTER SYMPTOMS
SINUS PAIN: 0
COUGH: 0
APNEA: 0
NAUSEA: 0
PHOTOPHOBIA: 0
ABDOMINAL PAIN: 0
EYE PAIN: 0
ABDOMINAL DISTENTION: 0
VOMITING: 0
SHORTNESS OF BREATH: 0

## 2021-05-06 NOTE — PROGRESS NOTES
Attending Physician Statement:    I have discussed the case of Brittany Holland, including pertinent history and exam findings with the resident. I have seen and examined the patient and the key elements of the encounter have been performed by me. I have reviewed medications, clinical laboratory, imaging and other diagnostic tests with the residents. I agree with the assessment, plan and orders as documented by the resident with changes made to the note as needed. Interval History: 5/6/2021  Patient was last in the clinic in February 2021. Since last clinic visit denies any recurrence of seizures or seizure-like activity or episodes of confusion or staring spells. Patient is compliant with Depakote 500 mg twice a day. Denies any side effects from Depakote  Denies any headaches or vision changes or memory problem or speech difficulty. Patient has a history of schizoaffective disorder, follows up with psychiatrist for that, currently taking Abilify, Seroquel and amitriptyline 100 mg at nighttime. Denies any side effects of these medications. Denies any other new neurologic concerns during this visit. Notes from 2/25/21  Mr. Brittany Holland is a 61 y.o. male was seen in the clinic for post hospital follow up. Patient was admitted on 1/28/2021, he was found unresponsive by his wife, EMS was called, patient was noted to have generalized tonic-clonic seizure activity at outlying facility, he was intubated for airway protection and was sent to [place] for further evaluation. Denies any prior history of seizures, patient was on Depakote for schizoaffective disorder. UDS was positive for cocaine. Patient was loaded with Keppra and Depakote on presentation. There was concern about pneumonia and was treated with Vanco and Zosyn. MRI brain 1/28/2021 did not show any acute findings. Showed mild global parenchymal volume loss consistent with chronic microvascular ischemic changes.   LTM E for 2 days showed diffuse encephalopathy, no seizure activity. LTM he was discontinued on 1/29/2021  Patient was found to have UTI and was treated with Rocephin. LP was done 1/29/2021, CSF studies showed glucose 49, protein 139, WBC 0, RBC 0, absent xanthochromia. Oligoclonal bands negative. Meningitis panel was negative. Lyme's disease antibody CSF negative, VDRL negative along with HSV PCR.  CSF culture was negative. Autoimmune encephalitis panel was not ordered during the hospitalization. Patient was discharged on 2/1/2021 on Depakote 750 mg at nighttime.     Since discharge from the hospital patient denies any recurrence of seizures or seizure-like activity or episodes of confusion or staring spells. He denies any new neurologic concerns during visit, continues to have dysarthric speech otherwise denies aphasia. He is compliant with his Depakote, currently taking 750 mg at nighttime. Denies any side effects from the medications.     Denies any other concerns.     Impression and Plan:      New onset seizure activity. Unclear etiology. Unclear if provoked secondary to substance use or epileptogenic. Patient's UDS was positive for cocaine on presentation MRI brain did not show any acute finding. LTM E for 2 days showed diffuse encephalopathy, no seizure activity. CSF studies were unremarkable except for elevated proteins 139.     History of schizoaffective disorder on Depakote  History of polysubstance abuse, UDS was positive for cocaine        Plan  -Continue Depakote 500 mg twice a day. Patient denies any side effects on this medications.     -We will continue to monitor symptoms, if patient symptoms worsen or does not improve may consider repeating MRI brain and CSF studies. Autoimmune encephalitis panel was not ordered during the hospitalization.     -Seizure Precautions:   counseled the patient with regard to seizure precautions for injury prevention and reinforced their rationale.    No driving for at least 6 months, no activity at dangerous heights, no operation of dangerous machinery, no baths, no swimming alone. Caution (preferably accompanied) with cooking or near fires. The patient expressed understanding.      -Patient has completed PT/OT/ST     - Follow up in the clinic with the resident in 6 months. - Instructed patient to call the clinic if symptoms worsen or develop any new symptoms.       Concepcion Castillo MD 5/6/2021 1:26 PM    Neurology

## 2021-05-06 NOTE — PROGRESS NOTES
[address] [place] [address], [facility] [address], [facility], [facility], [address] [place] [address]  P: [phone]  F: [phone]    NEUROLOGY CLINIC NOTE     PATIENT NAME: Douglas Pizano  PATIENT MRN: [id number]  PRIMARY CARE PHYSICIAN: Shawn Cowan MD    HPI:      Douglas Pizano is a 61 y.o. male who presents to clinic today as a follow up from his recent visit 2/25/2021 when he was seen for new onset seizure disorder. As you would recall from that visit  Past medical history significant for schizoaffective disorder on Depakote since November 2019 750 mg nightly. Patient was admitted under our neurology service 1/28/2021 for new onset seizure. Workup at that time was unremarkable with the exception of elevated CSF protein, concern for possible Autoimmune encephalitis. Since his recent visit depakote was continued 500mg BID and patient has not had any further seizure like activity and is feeling significantly better. Patient is accompanied by sister and wife who also state that he has been more active with improved mood and memory.      CSF studies 1/29/2021  -Glucose 49  -Protein 139  -Absent xanthochromia, 0 white blood cells, 0 RBCs  -Albumin index 262, oligoclonal bands negative  -Meningitis encephalitis panel negative, Lyme disease antibody CSF negative, VDRL CSF negative along with HSV PCR. -Gram stain and culture CSF negative     Pertinent imaging  -MRI brain with and without 1/28/2021-no acute intracranial abnormality or acute infarct. Mild global parenchymal volume loss consistent with chronic microvascular ischemic changes.         LTME started 1/28/2021 at 9:01 AM  Day 1-bursts of diffuse beta/alpha activity alternated with burst of generalized delta or document. Evidence of moderate to severe diffuse encephalopathy  Day 2-moderate to diffuse encephalopathy possible worse on the left side although concerning for lead artifacts.   LTM E discontinued 1/29/2021 at 10:44 PM      PREVIOUS WORKUP: Lab Results   Component Value Date    WBC 6.6 02/01/2021    HGB 13.8 02/01/2021    HCT 43.9 02/01/2021    MCV 88.9 02/01/2021     02/01/2021       Past Medical History:   Diagnosis Date    BPH (benign prostatic hyperplasia)     Schizophrenia (HCC)         No past surgical history on file.      Social History     Socioeconomic History    Marital status:      Spouse name: Not on file    Number of children: Not on file    Years of education: Not on file    Highest education level: Not on file   Occupational History    Not on file   Social Needs    Financial resource strain: Not on file    Food insecurity     Worry: Not on file     Inability: Not on file    Transportation needs     Medical: Not on file     Non-medical: Not on file   Tobacco Use    Smoking status: Current Every Day Smoker     Packs/day: 1.50     Years: 40.00     Pack years: 60.00     Types: Cigarettes    Smokeless tobacco: Never Used   Substance and Sexual Activity    Alcohol use: Not Currently    Drug use: Not Currently    Sexual activity: Not on file   Lifestyle    Physical activity     Days per week: Not on file     Minutes per session: Not on file    Stress: Not on file   Relationships    Social connections     Talks on phone: Not on file     Gets together: Not on file     Attends Jewish service: Not on file     Active member of club or organization: Not on file     Attends meetings of clubs or organizations: Not on file     Relationship status: Not on file    Intimate partner violence     Fear of current or ex partner: Not on file     Emotionally abused: Not on file     Physically abused: Not on file     Forced sexual activity: Not on file   Other Topics Concern    Not on file   Social History Narrative    Not on file        Current Outpatient Medications   Medication Sig Dispense Refill    lisinopril (PRINIVIL;ZESTRIL) 10 MG tablet take 1 tablet by mouth once daily      amitriptyline (ELAVIL) 100 MG tablet take 1/2 to 1 tablet by mouth at bedtime      divalproex (DEPAKOTE) 250 MG DR tablet Take 2 tablets by mouth 2 times daily 90 tablet 3    ARIPiprazole ER (ABILIFY MAINTENA) 400 MG PRSY Inject 400 mg into the muscle every 21 days       QUEtiapine (SEROQUEL XR) 300 MG extended release tablet Take 100 mg by mouth nightly        No current facility-administered medications for this visit. No Known Allergies     REVIEW OF SYSTEMS:     Review of Systems   Constitutional: Negative for activity change, appetite change, chills, fatigue and unexpected weight change. HENT: Negative for congestion, ear pain and sinus pain. Eyes: Negative for photophobia, pain and visual disturbance. Respiratory: Negative for apnea, cough and shortness of breath. Cardiovascular: Negative for chest pain, palpitations and leg swelling. Gastrointestinal: Negative for abdominal distention, abdominal pain, nausea and vomiting. Endocrine: Negative for cold intolerance and polydipsia. Genitourinary: Negative for difficulty urinating and flank pain. Musculoskeletal: Negative for arthralgias and myalgias. Skin: Negative for rash. Allergic/Immunologic: Negative for environmental allergies and food allergies. Neurological: Negative for dizziness, weakness and headaches. Hematological: Negative for adenopathy. Psychiatric/Behavioral: Negative for agitation and behavioral problems. VITALS  /74   Pulse 107   Ht 5' 6\" (1.676 m)   Wt 162 lb (73.5 kg)   SpO2 (!) 89%   BMI 26.15 kg/m²          NEUROLOGICAL EXAMINATION:     Neurological Exam  Mental Status  Awake, alert and oriented to person, place and time. Speech is normal. Language is fluent with no aphasia. Attention and concentration are normal.    Cranial Nerves  CN II: Visual acuity is normal. Visual fields full to confrontation. CN III, IV, VI: Extraocular movements intact bilaterally. Normal lids and orbits bilaterally.  Pupils equal round and 5/6/2021 at 1:19 PM

## 2021-11-11 ENCOUNTER — TELEMEDICINE (OUTPATIENT)
Dept: NEUROLOGY | Age: 61
End: 2021-11-11

## 2021-11-11 DIAGNOSIS — G40.909 SEIZURE DISORDER (HCC): Primary | ICD-10-CM

## 2021-11-11 ASSESSMENT — ENCOUNTER SYMPTOMS
SHORTNESS OF BREATH: 0
SINUS PAIN: 0
NAUSEA: 0
ABDOMINAL DISTENTION: 0
EYE PAIN: 0
VOMITING: 0
COUGH: 0
APNEA: 0
PHOTOPHOBIA: 0
ABDOMINAL PAIN: 0

## 2021-11-11 NOTE — PROGRESS NOTES
[address] [place] [address], [facility], [facility], [address] [facility], [address] [place] [address]  P: [phone]  F: [phone]    NEUROLOGY CLINIC NOTE     PATIENT NAME: oKbe Almeida  PATIENT MRN: [id number]  PRIMARY CARE PHYSICIAN: Halima Lombardi MD    HPI:      Kobe Almeida is a 64 y.o. male who presents to clinic today as a follow up from his recent visit 5/6/2021 for new onset seizures at that time. As you recall from our last visit Past medical history significant for schizoaffective disorder on Depakote since November 2019 750 mg nightly. Patient was admitted under our neurology service 1/28/2021 for new onset seizure. Workup at that time was unremarkable with the exception of elevated CSF protein, concern for possible Autoimmune encephalitis. Since his recent visit depakote was continued 500mg BID and patient has not had any further seizure like activity and is feeling significantly better. Today patient was unable to activate his E chart visit with video although was able to discuss his care on the phone. Patient denies any new seizures and has been well controlled on Depakote feels that his mood is stable. Patient denies any worsening of his mentation or confusion and feels that his memory has been stable. Recommend repeat MRI brain to rule out any new or worsening enhancing lesions.      CSF studies 1/29/2021  -Glucose 49  -Protein 139  -Absent xanthochromia, 0 white blood cells, 0 RBCs  -Albumin index 262, oligoclonal bands negative  -Meningitis encephalitis panel negative, Lyme disease antibody CSF negative, VDRL CSF negative along with HSV PCR.   -Gram stain and culture CSF negative     Pertinent imaging  -MRI brain with and without 1/28/2021-no acute intracranial abnormality or acute infarct.  Mild global parenchymal volume loss consistent with chronic microvascular ischemic changes.         LTME started 1/28/2021 at 9:01 AM  Day 1-bursts of diffuse beta/alpha activity alternated Not on file    Attends Confucianism Services: Not on file    Active Member of Clubs or Organizations: Not on file    Attends Club or Organization Meetings: Not on file    Marital Status: Not on file   Intimate Partner Violence:     Fear of Current or Ex-Partner: Not on file    Emotionally Abused: Not on file    Physically Abused: Not on file    Sexually Abused: Not on file   Housing Stability:     Unable to Pay for Housing in the Last Year: Not on file    Number of [place] in the Last Year: Not on file    Unstable Housing in the Last Year: Not on file        Current Outpatient Medications   Medication Sig Dispense Refill    lisinopril (PRINIVIL;ZESTRIL) 10 MG tablet take 1 tablet by mouth once daily      amitriptyline (ELAVIL) 100 MG tablet take 1/2 to 1 tablet by mouth at bedtime      divalproex (DEPAKOTE) 250 MG DR tablet Take 2 tablets by mouth 2 times daily 90 tablet 3    ARIPiprazole ER (ABILIFY MAINTENA) 400 MG PRSY Inject 400 mg into the muscle every 21 days       QUEtiapine (SEROQUEL XR) 300 MG extended release tablet Take 100 mg by mouth nightly        No current facility-administered medications for this visit. No Known Allergies     REVIEW OF SYSTEMS:     Review of Systems   Constitutional: Negative for activity change, appetite change, chills, fatigue and unexpected weight change. HENT: Negative for congestion, ear pain and sinus pain. Eyes: Negative for photophobia, pain and visual disturbance. Respiratory: Negative for apnea, cough and shortness of breath. Cardiovascular: Negative for chest pain, palpitations and leg swelling. Gastrointestinal: Negative for abdominal distention, abdominal pain, nausea and vomiting. Endocrine: Negative for cold intolerance and polydipsia. Genitourinary: Negative for difficulty urinating and flank pain. Musculoskeletal: Negative for arthralgias and myalgias. Skin: Negative for rash.    Allergic/Immunologic: Negative for environmental allergies and food allergies. Neurological: Negative for dizziness, weakness and headaches. Hematological: Negative for adenopathy. Psychiatric/Behavioral: Negative for agitation and behavioral problems. VITALS  There were no vitals taken for this visit. NEUROLOGICAL EXAMINATION:   Limited neurologic and physical exam given virtual telehealth visit over the phone patient was unable to access his camera at time of his visit. Patient states that he has not had any new focal neurologic weakness, numbness or seizure-like activity. ASSESSMENT / PLAN:   Asberry Paget is a 42-year-old  male who presents as follow-up from his recent visit 5/6/2021 for seizure disorder with history of schizoaffective. Patient was UDS positive for cocaine thought to be provoked seizure. Has not had any further seizure-like activity since stable on Depakote 500 mg twice daily. Recommend continuing Depakote 500 mg twice daily routine follow-up in 6 months. Patient's mentation has significantly improved along with his mood disorder has not had any further seizure-like activity. Given patient's abnormal CSF studies and MRI brain will repeat MRI brain with contrast and follow-up within 6 months unless there is an abnormality noted in the brain. Patient given new prescription for Depakote to continue 500 mg twice daily.     Assessment  1. Seizure disorder   2. Schizoaffective disorder     Plan   -Continue Depakote 500 mg twice daily can consider increasing if needed in the future  -Follow-up in 6 months  -Repeat MRI brain with and without contrast      Mr. Candance Blood received counseling on the following healthy behaviors: medical compliance, smoking cessation, blood pressure control, regular follow up with primary doctor.         Electronically signed by Nessa Dunlap MD on 11/11/2021 at 2:49 PM

## 2023-10-15 ENCOUNTER — HOSPITAL ENCOUNTER (EMERGENCY)
Age: 63
Discharge: HOME | End: 2023-10-15
Payer: COMMERCIAL

## 2023-10-15 VITALS
DIASTOLIC BLOOD PRESSURE: 79 MMHG | SYSTOLIC BLOOD PRESSURE: 154 MMHG | HEART RATE: 63 BPM | TEMPERATURE: 98.4 F | OXYGEN SATURATION: 98 % | RESPIRATION RATE: 16 BRPM

## 2023-10-15 VITALS — BODY MASS INDEX: 22.6 KG/M2

## 2023-10-15 DIAGNOSIS — F20.9: ICD-10-CM

## 2023-10-15 DIAGNOSIS — E86.0: Primary | ICD-10-CM

## 2023-10-15 DIAGNOSIS — Z79.899: ICD-10-CM

## 2023-10-15 DIAGNOSIS — R41.82: ICD-10-CM

## 2023-10-15 DIAGNOSIS — F17.210: ICD-10-CM

## 2023-10-15 DIAGNOSIS — E72.20: ICD-10-CM

## 2023-10-15 DIAGNOSIS — N28.9: ICD-10-CM

## 2023-10-15 LAB
ADD MANUAL DIFF: NO
ALANINE AMINOTRANSFERASE: 28 U/L (ref 16–63)
ALBUMIN GLOBULIN RATIO: 0.9
ALBUMIN LEVEL: 3.3 G/DL (ref 3.4–5)
ALKALINE PHOSPHATASE: 74 U/L (ref 46–116)
AMMONIA: 34 UMOL/L (ref 11–32)
ANION GAP: 10.5
ASPARTATE AMINO TRANSFERASE: 59 U/L (ref 15–37)
BLOOD UREA NITROGEN: 20 MG/DL (ref 7–18)
CALCIUM: 8.7 MG/DL (ref 8.5–10.1)
CANNABINOID SCREEN URINE: NEGATIVE
CARBON DIOXIDE: 31.3 MMOL/L (ref 21–32)
CAST SEEN?: (no result) #/LPF
CHLORIDE: 104 MMOL/L (ref 98–107)
CO2 BLD-SCNC: 31.3 MMOL/L (ref 21–32)
ESTIMATED GFR (AFRICAN AMERICA: >60 (ref 60–?)
ESTIMATED GFR (NON-AFRICAN AME: >60 (ref 60–?)
GLOBULIN: 3.6 G/DL
GLUCOSE BLD-MCNC: 90 MG/DL (ref 74–106)
GLUCOSE URINE UA: NEGATIVE MG/DL
HCT VFR BLD CALC: 42.5 % (ref 42–54)
HEMATOCRIT: 42.5 % (ref 42–54)
HEMOGLOBIN: 13.8 G/DL (ref 14–18)
IMMATURE GRANULOCYTES ABS AUTO: 0.01 10^3/UL (ref 0–0.03)
IMMATURE GRANULOCYTES PCT AUTO: 0.2 % (ref 0–0.5)
LYMPHOCYTES  ABSOLUTE AUTO: 1.7 10^3/UL (ref 1.2–3.8)
MCV RBC: 90.2 FL (ref 80–94)
MEAN CORPUSCULAR HEMOGLOBIN: 29.3 PG (ref 25.9–34)
MEAN CORPUSCULAR HGB CONC: 32.5 G/DL (ref 29.9–35.2)
MEAN CORPUSCULAR VOLUME: 90.2 FL (ref 80–94)
METHAMPHETAMINES SCREEN URINE: NEGATIVE
PLATELET # BLD: 223 10^3/UL (ref 150–450)
PLATELET COUNT: 223 10^3/UL (ref 150–450)
POTASSIUM SERPLBLD-SCNC: 3.8 MMOL/L (ref 3.5–5.1)
POTASSIUM: 3.8 MMOL/L (ref 3.5–5.1)
RED BLOOD COUNT: 4.71 10^6/UL (ref 4.7–6.1)
SODIUM BLD-SCNC: 142 MMOL/L (ref 136–145)
SODIUM: 142 MMOL/L (ref 136–145)
TOTAL PROTEIN: 6.9 G/DL (ref 6.4–8.2)
TRICYCLIC ANTIDEPRESSANT URINE: NEGATIVE
URINE CULTURE INDICATED: NO
WBC # BLD: 5.5 10^3/UL (ref 4–11)
WHITE BLOOD COUNT: 5.5 10^3/UL (ref 4–11)

## 2023-10-15 PROCEDURE — 80320 DRUG SCREEN QUANTALCOHOLS: CPT

## 2023-10-15 PROCEDURE — 36415 COLL VENOUS BLD VENIPUNCTURE: CPT

## 2023-10-15 PROCEDURE — 93005 ELECTROCARDIOGRAM TRACING: CPT

## 2023-10-15 PROCEDURE — 96360 HYDRATION IV INFUSION INIT: CPT

## 2023-10-15 PROCEDURE — 80307 DRUG TEST PRSMV CHEM ANLYZR: CPT

## 2023-10-15 PROCEDURE — 81001 URINALYSIS AUTO W/SCOPE: CPT

## 2023-10-15 PROCEDURE — 99285 EMERGENCY DEPT VISIT HI MDM: CPT

## 2023-10-15 PROCEDURE — 85025 COMPLETE CBC W/AUTO DIFF WBC: CPT

## 2023-10-15 PROCEDURE — 82140 ASSAY OF AMMONIA: CPT

## 2023-10-15 PROCEDURE — 80053 COMPREHEN METABOLIC PANEL: CPT

## 2023-10-15 NOTE — ECG_ITS
[facility] 
                                        
                                       Test Date:    2023-10-15 
Pat Name:     KATIE SPAIN            Department:    
Patient ID:   [id number]               Room:         - 
Gender:       Male                     Technician:    
:          1960               Requested By: LALI CLEMENTS 
Order Number: [id number]              Reading MD:   LALI CLEMENTS 
                                 Measurements 
Intervals                              Axis           
Rate:         65                       P:            68 
ND:           168                      QRS:          89 
QRSD:         90                       T:            70 
QT:           390                                     
QTc:          402                                     
                           Interpretive Statements 
1100 Sinus rhythm 
Non-Specific T wave inversion in aVL 
1102 Sinus arrhythmia 
0104 ELECTRODE(S) DETACHED ...  Repeat ECG is requested 
9110 **  normal ECG  ** 
No previous ECG available for comparison 
Electronically Signed On 10- 7:08:18 EDT by LALI CLEMENTS

## 2023-10-15 NOTE — ED.AMS1
HPI - Altered Mental Status
General
Chief Complaint: Altered Mental Status
Stated Complaint: CONFUSION
Time Seen by Provider: 10/15/23 17:23
Source: family
Mode of arrival: walk-in
Limitations: no limitations
History of Present Illness
HPI narrative: 
patient brought in by family who told us that the patient has  not been acting like himself . He has schizophrenia and it is not clear if he has taken too much of his psych meds or if he has missed doses.
He has no focal complaints but also cannot give a good ROS.
Wife said he has not been drinking fluids like he normally does for the past few days. Sometimes when that happens his behavior is affected.  wife also told me that the patient is not good about taking his medications on a regular basis frequently 
misses doses. She said that she will work with him to ensure that he is taking his medications as prescribed.
She does not suspect any intoxicants and the patient stays with his sister during days when the wife works and the sister did not report any injury or worrisome behavior/activity
Related Data
Home Medications

 Medication  Instructions  Recorded  Confirmed
aripiprazole 10 mg tablet 10 mg PO BEDTIME 10/15/23 10/15/23
clonazepam 1 mg tablet 1 mg PO TID PRN anxiety 10/15/23 10/15/23
divalproex 500 mg tablet,extended 1,000 mg PO DAILY 10/15/23 10/15/23
release 24 hr   
levetiracetam 750 mg tablet 750 mg PO BID 10/15/23 10/15/23


Allergies

Allergy/AdvReac Type Severity Reaction Status Date / Time
No Known Drug Allergies Allergy   Verified 10/15/23 17:25



\[facility] [place] [facility]\""H
[facility]
Social History
Smoking status:  Heavy tobacco smoker 



Exam
Narrative
Exam Narrative: 
Nurses notes and vital signs reviewed and patient is not hypoxic.
afebrile
General:  Well-appearing and in no apparent distress.  
Skin:  Warm, dry, no pallor noted.  
No rash.
Head:  Normocephalic, atraumatic.
Neck:  Supple, non-tender.
Eye:  Pupils are equal, round and EOMI. No scleral icterus.
Ears, Nose, Mouth, and Throat:  TM are clear, no nasal mucosal hypertrophy.  Oral mucosa is moist, no posterior oropharynx erythema, uvula is mid-line
Cardiovascular:  Regular Rate and Rhythm without murmur, gallop or rub.
Respiratory:  No accessory muscle use or respiratory distress.
Lungs are clear to auscultation, no wheezing, rales or rhonchi
Chest Wall:  no tenderness
Back: No midline thoracic or lumbar vertebral tenderness. No CVA tenderness
Musculoskeletal:  normal ROM, no calf or popliteal tenderness, no lower extremity edema/swelling
GI:  Abdomen is soft, non-distended.  Normal bowel sounds.
No masses appreciated.
No tenderness to palpation. No rebound, guarding, or rigidity noted.
Neurological:  A&O x4.  No cranial nerve dysfunction observed.  No truncal ataxia. Moves all extremities. Sensation intact.
Psychiatric:  Cooperative and interactive. Normal mood and affect.
Constitutional
Vital Signs, click to edit/add: 

Last Vital Signs

Temp  98.4 F   10/15/23 17:25
Pulse  63   10/15/23 17:25
Resp  16   10/15/23 17:25
BP  154/79 H  10/15/23 17:25
Pulse Ox  98   10/15/23 17:25




Course
Vital Signs
Vital signs: 

Vital Signs

Temperature  98.4 F   10/15/23 17:25
Pulse Rate  63   10/15/23 17:25
Respiratory Rate  16   10/15/23 17:25
Blood Pressure  154/79 H  10/15/23 17:25
Pulse Oximetry  98   10/15/23 17:25



Temperature  98.4 F   10/15/23 17:25
Pulse Rate  63   10/15/23 17:25
Respiratory Rate  16   10/15/23 17:25
Blood Pressure  154/79 H  10/15/23 17:25
Pulse Oximetry  98   10/15/23 17:25




MDM - Altered Mental Status
MDM Narrative
Medical decision making narrative: 
Patient was placed on cardiac monitor and EKG obtained. Blood drawn and sent for evaluation.
The patient's BUN was elevated, which is consistent with the wife's fear of possible dehydration. He had a slight elevation of one of his liver function tests and his ammonia level but I do not think that this is a daily indicator of why he is 
behaving slightly differently.
He received a liter of normal saline IV fluid and his behavior actually improved. I talked with the wife and she will take the patient home.  I talked to the patient he is agreeable to have the wife help him with his medications.  He should 
discharged home upon completion of the NS IVF bolus.
Lab Data
Attestation: I reviewed the patient's lab results.
Labs: 

Lab Results

  10/15/23 10/15/23 Range/Units
  17:42 17:55 
WBC  5.5   (4.0-11.0)  10^3/uL
RBC  4.71   (4.70-6.10)  10^6/uL
Hgb  13.8 L   (14.0-18.0)  g/dL
Hct  42.5   (42.0-54.0)  %
MCV  90.2   (80.0-94.0)  fL
MCH  29.3   (25.9-34.0)  pg
MCHC  32.5   (29.9-35.2)  g/dL
RDW  12.6   (11.0-15.0)  %
Plt Count  223   (150-450)  10^3/uL
MPV  11.4   (9.5-13.5)  fL
Neut % (Auto)  52.5   (43.0-75.0)  %
Lymph % (Auto)  31.0   (20.5-60.0)  %
Mono % (Auto)  11.9   (1.7-12.0)  %
Eos % (Auto)  2.9   (0.9-7.0)  %
Baso % (Auto)  1.5   (0.2-2.0)  %
Neut # (Auto)  2.9   (1.4-6.5)  10^3/uL
Lymph # (Auto)  1.7   (1.2-3.8)  10^3/uL
Mono # (Auto)  0.7   (0.3-0.8)  10^3/uL
Eos # (Auto)  0.2   (0.0-0.7)  10^3/uL
Baso # (Auto)  0.1   (0.0-0.1)  10^3/uL
Abs Immat Gran (auto)  0.01   (0.00-0.03)  10^3/uL
Imm/Tot Granulo (auto)  0.2   (0.0-0.5)  %
Sodium  142   (136-145)  mmol/L
Potassium  3.8   (3.5-5.1)  mmol/L
Chloride  104   ()  mmol/L
Carbon Dioxide  31.3   (21.0-32.0)  mmol/L
Anion Gap  10.5   
BUN  20.0 H   (7.0-18.0)  mg/dL
Creatinine  1.06   (0.70-1.30)  mg/dL
Est GFR ( Amer)  >60   (>=60)  
Est GFR (Non-Af Amer)  >60   (>=60)  
BUN/Creatinine Ratio  18.9   
Glucose  90   ()  mg/dL
Calcium  8.7   (8.5-10.1)  mg/dL
Total Bilirubin  0.4   (0.2-1.0)  mg/dL
AST  59 H   (15-37)  U/L
ALT  28   (16-63)  U/L
Alkaline Phosphatase  74   ()  U/L
Ammonia  34 H   (11-32)  umol/L
Total Protein  6.9   (6.4-8.2)  g/dL
Albumin  3.3 L   (3.4-5.0)  g/dL
Globulin  3.6   g/dL
Albumin/Globulin Ratio  0.9   
Urine Color   Yellow  (YELLOW)  
Urine Clarity   Clear  (CLEAR)  
Urine pH   7.0  (5.0-9.0)  
Ur Specific Gravity   1.025  (1.005-1.025)  
Urine Protein   Trace  (NEG/TRACE)  mg/dL
Urine Glucose (UA)   Negative  (NEGATIVE)  mg/dL
Urine Ketones   Negative  (NEGATIVE)  mg/dL
Urine Occult Blood   Large A  (NEGATIVE)  
Urine Nitrite   Negative  (NEGATIVE)  
Urine Bilirubin   Negative  (NEGATIVE)  
Urine Urobilinogen   >=8.0  (0.2-1.0)  EU/dL
Ur Leukocyte Esterase   Negative  (NEGATIVE)  
Urine RBC   5-10 A  (0-2)  #/HPF
Urine WBC   0-2 A  (NONE SEEN)  #/HPF
Ur Squamous Epith Cells   Few A  (NONE/RARE)  #/LPF
Urine Crystals   None seen  (None Seen)  #/HPF
Urine Bacteria   Trace A  (NONE SEEN)  #/HPF
Urine Casts   Seen A  (NONE SEEN)  #/LPF
Hyaline Casts   Rare  
Urine Mucus   Trace A  (NONE SEEN)  
Ur Culture Indicated?   No  
Urine Opiates Screen   Negative  (NEGATIVE)  
Ur Buprenorphine Scrn   Negative  (NEGATIVE)  
Ur Oxycodone Screen   Negative  (NEGATIVE)  
Urine Methadone Screen   Negative  (NEGATIVE)  
Ur Propoxyphene Screen   Negative  (NEGATIVE)  
Ur Barbiturates Screen   Negative  (NEGATIVE)  
U Tricyclic Antidepress   Negative  (NEGATIVE)  
Ur Phencyclidine Scrn   Negative  (NEGATIVE)  
Ur Amphetamines Screen   Negative  (NEGATIVE)  
U Methamphetamines Scrn   Negative  (NEGATIVE)  
U Benzodiazepines Scrn   Negative  (NEGATIVE)  
Urine Cocaine Screen   Negative  (NEGATIVE)  
U Cannabinoids Screen   Negative  (NEGATIVE)  
Ethanol Quant  <3   mg/dL



ECG Data
Interpretation: 
EKG interpretation:  Emergency Department physician interpretation.  V2 is missing.  Normal sinus rhythm at 65bpm.  Normal axis, normal intervals and no ST segment elevation or depression.

Discharge Plan
Discharge
Chief Complaint: Altered Mental Status

Clinical Impression:
 Acute dehydration, Acute renal insufficiency, Hyperammonemia, Altered mental status


Patient Disposition: Home, Self-Care

Time of Disposition Decision: 18:50

Prescriptions / Home Meds:
No Action
  levetiracetam 750 mg tablet 
   750 mg PO BID 
  aripiprazole 10 mg tablet 
   10 mg PO BEDTIME 
  clonazepam 1 mg tablet 
   1 mg PO TID PRN (Reason: anxiety) 
  divalproex 500 mg tablet extended release 24 hr 
   1,000 mg PO DAILY 

Instructions:  Dehydration (ED), Altered Mental Status (ED)

Stand Alone Forms:  Portal Instructions

Referrals:
Timur Medrano MD [Primary Care Provider] - 1 week

## 2023-10-15 NOTE — ED_ITS
HPI - Altered Mental Status    
General    
Chief Complaint: Altered Mental Status    
Stated Complaint: CONFUSION    
Time Seen by Provider: 10/15/23 17:23    
Source: family    
Mode of arrival: walk-in    
Limitations: no limitations    
History of Present Illness    
HPI narrative:     
patient brought in by family who told us that the patient has  not been acting   
like himself . He has schizophrenia and it is not clear if he has taken too much  
of his psych meds or if he has missed doses.    
He has no focal complaints but also cannot give a good ROS.    
Wife said he has not been drinking fluids like he normally does for the past few  
days. Sometimes when that happens his behavior is affected.  wife also told me   
that the patient is not good about taking his medications on a regular basis   
frequently misses doses. She said that she will work with him to ensure that he   
is taking his medications as prescribed.    
She does not suspect any intoxicants and the patient stays with his sister   
during days when the wife works and the sister did not report any injury or   
worrisome behavior/activity    
Related Data    
                                Home Medications    
    
    
    
 Medication  Instructions  Recorded  Confirmed    
     
aripiprazole 10 mg tablet 10 mg PO BEDTIME 10/15/23 10/15/23    
     
clonazepam 1 mg tablet 1 mg PO TID PRN anxiety 10/15/23 10/15/23    
     
divalproex 500 mg tablet,extended 1,000 mg PO DAILY 10/15/23 10/15/23    
    
release 24 hr       
     
levetiracetam 750 mg tablet 750 mg PO BID 10/15/23 10/15/23    
    
    
    
                                    Allergies    
    
    
    
Allergy/AdvReac Type Severity Reaction Status Date / Time    
     
No Known Drug Allergies Allergy   Verified 10/15/23 17:25    
    
    
    
    
[facility]    
[facility]    
Social History    
Smoking status:  Heavy tobacco smoker     
    
    
    
Exam    
Narrative    
Exam Narrative:     
Nurses notes and vital signs reviewed and patient is not hypoxic.    
afebrile    
General:  Well-appearing and in no apparent distress.      
Skin:  Warm, dry, no pallor noted.      
No rash.    
Head:  Normocephalic, atraumatic.    
Neck:  Supple, non-tender.    
Eye:  Pupils are equal, round and EOMI. No scleral icterus.    
Ears, Nose, Mouth, and Throat:  TM are clear, no nasal mucosal hypertrophy.    
Oral mucosa is moist, no posterior oropharynx erythema, uvula is mid-line    
Cardiovascular:  Regular Rate and Rhythm without murmur, gallop or rub.    
Respiratory:  No accessory muscle use or respiratory distress.    
Lungs are clear to auscultation, no wheezing, rales or rhonchi    
Chest Wall:  no tenderness    
Back: No midline thoracic or lumbar vertebral tenderness. No CVA tenderness    
Musculoskeletal:  normal ROM, no calf or popliteal tenderness, no lower   
extremity edema/swelling    
GI:  Abdomen is soft, non-distended.  Normal bowel sounds.    
No masses appreciated.    
No tenderness to palpation. No rebound, guarding, or rigidity noted.    
Neurological:  A&O x4.  No cranial nerve dysfunction observed.  No truncal   
ataxia. Moves all extremities. Sensation intact.    
Psychiatric:  Cooperative and interactive. Normal mood and affect.    
Constitutional    
Vital Signs, click to edit/add:     
    
                                Last Vital Signs    
    
    
    
Temp  98.4 F   10/15/23 17:25    
     
Pulse  63   10/15/23 17:25    
     
Resp  16   10/15/23 17:25    
     
BP  154/79 H  10/15/23 17:25    
     
Pulse Ox  98   10/15/23 17:25    
    
    
    
    
    
Course    
Vital Signs    
Vital signs:     
    
                                   Vital Signs    
    
    
    
Temperature  98.4 F   10/15/23 17:25    
     
Pulse Rate  63   10/15/23 17:25    
     
Respiratory Rate  16   10/15/23 17:25    
     
Blood Pressure  154/79 H  10/15/23 17:25    
     
Pulse Oximetry  98   10/15/23 17:25    
    
    
                                            
    
    
    
Temperature  98.4 F   10/15/23 17:25    
     
Pulse Rate  63   10/15/23 17:25    
     
Respiratory Rate  16   10/15/23 17:25    
     
Blood Pressure  154/79 H  10/15/23 17:25    
     
Pulse Oximetry  98   10/15/23 17:25    
    
    
    
    
    
MDM - Altered Mental Status    
MDM Narrative    
Medical decision making narrative:     
Patient was placed on cardiac monitor and EKG obtained. Blood drawn and sent for  
evaluation.    
The patient's BUN was elevated, which is consistent with the wife's fear of   
possible dehydration. He had a slight elevation of one of his liver function   
tests and his ammonia level but I do not think that this is a daily indicator of  
why he is behaving slightly differently.    
He received a liter of normal saline IV fluid and his behavior actually   
improved. I talked with the wife and she will take the patient home.  I talked   
to the patient he is agreeable to have the wife help him with his medications.    
He should discharged home upon completion of the NS IVF bolus.    
Lab Data    
Attestation: I reviewed the patient's lab results.    
Labs:     
    
                                   Lab Results    
    
    
    
  10/15/23 10/15/23 Range/Units    
    
  17:42 17:55     
     
WBC  5.5   (4.0-11.0)  10^3/uL    
     
RBC  4.71   (4.70-6.10)  10^6/uL    
     
Hgb  13.8 L   (14.0-18.0)  g/dL    
     
Hct  42.5   (42.0-54.0)  %    
     
MCV  90.2   (80.0-94.0)  fL    
     
MCH  29.3   (25.9-34.0)  pg    
     
MCHC  32.5   (29.9-35.2)  g/dL    
     
RDW  12.6   (11.0-15.0)  %    
     
Plt Count  223   (150-450)  10^3/uL    
     
MPV  11.4   (9.5-13.5)  fL    
     
Neut % (Auto)  52.5   (43.0-75.0)  %    
     
Lymph % (Auto)  31.0   (20.5-60.0)  %    
     
Mono % (Auto)  11.9   (1.7-12.0)  %    
     
Eos % (Auto)  2.9   (0.9-7.0)  %    
     
Baso % (Auto)  1.5   (0.2-2.0)  %    
     
Neut # (Auto)  2.9   (1.4-6.5)  10^3/uL    
     
Lymph # (Auto)  1.7   (1.2-3.8)  10^3/uL    
     
Mono # (Auto)  0.7   (0.3-0.8)  10^3/uL    
     
Eos # (Auto)  0.2   (0.0-0.7)  10^3/uL    
     
Baso # (Auto)  0.1   (0.0-0.1)  10^3/uL    
     
Abs Immat Gran (auto)  0.01   (0.00-0.03)  10^3/uL    
     
Imm/Tot Granulo (auto)  0.2   (0.0-0.5)  %    
     
Sodium  142   (136-145)  mmol/L    
     
Potassium  3.8   (3.5-5.1)  mmol/L    
     
Chloride  104   ()  mmol/L    
     
Carbon Dioxide  31.3   (21.0-32.0)  mmol/L    
     
Anion Gap  10.5       
     
BUN  20.0 H   (7.0-18.0)  mg/dL    
     
Creatinine  1.06   (0.70-1.30)  mg/dL    
     
Est GFR ( Amer)  >60   (>=60)      
     
Est GFR (Non-Af Amer)  >60   (>=60)      
     
BUN/Creatinine Ratio  18.9       
     
Glucose  90   ()  mg/dL    
     
Calcium  8.7   (8.5-10.1)  mg/dL    
     
Total Bilirubin  0.4   (0.2-1.0)  mg/dL    
     
AST  59 H   (15-37)  U/L    
     
ALT  28   (16-63)  U/L    
     
Alkaline Phosphatase  74   ()  U/L    
     
Ammonia  34 H   (11-32)  umol/L    
     
Total Protein  6.9   (6.4-8.2)  g/dL    
     
Albumin  3.3 L   (3.4-5.0)  g/dL    
     
Globulin  3.6   g/dL    
     
Albumin/Globulin Ratio  0.9       
     
Urine Color   Yellow  (YELLOW)      
     
Urine Clarity   Clear  (CLEAR)      
     
Urine pH   7.0  (5.0-9.0)      
     
Ur Specific Gravity   1.025  (1.005-1.025)      
     
Urine Protein   Trace  (NEG/TRACE)  mg/dL    
     
Urine Glucose (UA)   Negative  (NEGATIVE)  mg/dL    
     
Urine Ketones   Negative  (NEGATIVE)  mg/dL    
     
Urine Occult Blood   Large A  (NEGATIVE)      
     
Urine Nitrite   Negative  (NEGATIVE)      
     
Urine Bilirubin   Negative  (NEGATIVE)      
     
Urine Urobilinogen   >=8.0  (0.2-1.0)  EU/dL    
     
Ur Leukocyte Esterase   Negative  (NEGATIVE)      
     
Urine RBC   5-10 A  (0-2)  #/HPF    
     
Urine WBC   0-2 A  (NONE SEEN)  #/HPF    
     
Ur Squamous Epith Cells   Few A  (NONE/RARE)  #/LPF    
     
Urine Crystals   None seen  (None Seen)  #/HPF    
     
Urine Bacteria   Trace A  (NONE SEEN)  #/HPF    
     
Urine Casts   Seen A  (NONE SEEN)  #/LPF    
     
Hyaline Casts   Rare      
     
Urine Mucus   Trace A  (NONE SEEN)      
     
Ur Culture Indicated?   No      
     
Urine Opiates Screen   Negative  (NEGATIVE)      
     
Ur Buprenorphine Scrn   Negative  (NEGATIVE)      
     
Ur Oxycodone Screen   Negative  (NEGATIVE)      
     
Urine Methadone Screen   Negative  (NEGATIVE)      
     
Ur Propoxyphene Screen   Negative  (NEGATIVE)      
     
Ur Barbiturates Screen   Negative  (NEGATIVE)      
     
U Tricyclic Antidepress   Negative  (NEGATIVE)      
     
Ur Phencyclidine Scrn   Negative  (NEGATIVE)      
     
Ur Amphetamines Screen   Negative  (NEGATIVE)      
     
U Methamphetamines Scrn   Negative  (NEGATIVE)      
     
U Benzodiazepines Scrn   Negative  (NEGATIVE)      
     
Urine Cocaine Screen   Negative  (NEGATIVE)      
     
U Cannabinoids Screen   Negative  (NEGATIVE)      
     
Ethanol Quant  <3   mg/dL    
    
    
    
    
ECG Data    
Interpretation:     
EKG interpretation:  Emergency Department physician interpretation.  V2 is   
missing.  Normal sinus rhythm at 65bpm.  Normal axis, normal intervals and no ST  
segment elevation or depression.    
    
Discharge Plan    
Discharge    
Chief Complaint: Altered Mental Status    
    
Clinical Impression:    
 Acute dehydration, Acute renal insufficiency, Hyperammonemia, Altered mental   
status    
    
    
Patient Disposition: Home, Self-Care    
    
Time of Disposition Decision: 18:50    
    
Prescriptions / Home Meds:    
No Action    
  levetiracetam 750 mg tablet     
   750 mg PO BID     
  aripiprazole 10 mg tablet     
   10 mg PO BEDTIME     
  clonazepam 1 mg tablet     
   1 mg PO TID PRN (Reason: anxiety)     
  divalproex 500 mg tablet extended release 24 hr     
   1,000 mg PO DAILY     
    
Instructions:  Dehydration (ED), Altered Mental Status (ED)    
    
Stand Alone Forms:  Portal Instructions    
    
Referrals:    
Timur Medrano MD [Primary Care Provider] - 1 week

## 2025-05-09 ENCOUNTER — HOSPITAL ENCOUNTER (EMERGENCY)
Age: 65
Discharge: HOME | End: 2025-05-09
Payer: COMMERCIAL

## 2025-05-09 VITALS
SYSTOLIC BLOOD PRESSURE: 137 MMHG | BODY MASS INDEX: 31 KG/M2 | HEART RATE: 84 BPM | OXYGEN SATURATION: 93 % | DIASTOLIC BLOOD PRESSURE: 68 MMHG | TEMPERATURE: 98.4 F

## 2025-05-09 VITALS — HEART RATE: 83 BPM | OXYGEN SATURATION: 100 %

## 2025-05-09 VITALS — HEART RATE: 85 BPM

## 2025-05-09 VITALS — HEART RATE: 80 BPM | OXYGEN SATURATION: 95 %

## 2025-05-09 VITALS — SYSTOLIC BLOOD PRESSURE: 137 MMHG | DIASTOLIC BLOOD PRESSURE: 68 MMHG | OXYGEN SATURATION: 92 % | HEART RATE: 83 BPM

## 2025-05-09 VITALS — HEART RATE: 81 BPM | OXYGEN SATURATION: 94 %

## 2025-05-09 VITALS — DIASTOLIC BLOOD PRESSURE: 71 MMHG | SYSTOLIC BLOOD PRESSURE: 149 MMHG

## 2025-05-09 VITALS — HEART RATE: 81 BPM | OXYGEN SATURATION: 96 %

## 2025-05-09 VITALS — HEART RATE: 84 BPM

## 2025-05-09 VITALS — HEART RATE: 81 BPM

## 2025-05-09 VITALS — OXYGEN SATURATION: 95 % | HEART RATE: 80 BPM

## 2025-05-09 VITALS — OXYGEN SATURATION: 95 % | HEART RATE: 79 BPM

## 2025-05-09 DIAGNOSIS — F20.9: Primary | ICD-10-CM

## 2025-05-09 DIAGNOSIS — R41.82: ICD-10-CM

## 2025-05-09 DIAGNOSIS — F17.200: ICD-10-CM

## 2025-05-09 LAB
ACETAMINOPHEN: <2 UG/ML (ref 10–30)
ADD MANUAL DIFF: NO
ALANINE AMINOTRANSFERASE: 64 U/L (ref 16–63)
ALBUMIN GLOBULIN RATIO: 0.9
ALBUMIN LEVEL: 3.4 G/DL (ref 3.4–5)
ALKALINE PHOSPHATASE: 84 U/L (ref 46–116)
AMMONIA: 35 UMOL/L (ref 11–32)
ANION GAP: 10.3
ASPARTATE AMINO TRANSFERASE: 135 U/L (ref 15–37)
CALCIUM: 9.1 MG/DL (ref 8.5–10.1)
CHLORIDE: 108 MMOL/L (ref 98–107)
CO2 BLD-SCNC: 27.1 MMOL/L (ref 21–32)
ESTIMATED GFR (AFRICAN AMERICA: >60
ESTIMATED GFR (NON-AFRICAN AME: >60
GLOBULIN: 3.8 G/DL
GLUCOSE SERPLBLD-MCNC: 101 MG/DL (ref 74–106)
HCT VFR BLD CALC: 42.7 % (ref 42–54)
HEMOGLOBIN: 14.1 G/DL (ref 14–18)
IMMATURE GRANULOCYTES ABS AUTO: 0.01 10^3/UL (ref 0–0.03)
IMMATURE GRANULOCYTES PCT AUTO: 0.2 % (ref 0–0.5)
INR: 1.06
LYMPHOCYTES  ABSOLUTE AUTO: 1.4 10^3/UL (ref 1.2–3.8)
MCH RBC QN AUTO: 29.3 PG (ref 25.9–34)
MCV RBC: 88.8 FL (ref 80–94)
PLATELET # BLD: 267 10^3/UL (ref 150–450)
POTASSIUM SERPLBLD-SCNC: 3.4 MMOL/L (ref 3.5–5.1)
PROTHROMBIN TIME: 11.2 SEC (ref 9–11.6)
RBC # BLD AUTO: 4.81 10^6/UL (ref 4.7–6.1)
SALICYLATE: <2.8 MG/DL (ref ?–19.9)
SODIUM BLD-SCNC: 142 MMOL/L (ref 136–145)
THYROID STIMULATING HORMONE: 1.42 UIU/ML (ref 0.36–3.74)
TOTAL PROTEIN: 7.2 G/DL (ref 6.4–8.2)
WBC # BLD: 6.6 10^3/UL (ref 4–11)

## 2025-05-09 PROCEDURE — 80179 DRUG ASSAY SALICYLATE: CPT

## 2025-05-09 PROCEDURE — 70450 CT HEAD/BRAIN W/O DYE: CPT

## 2025-05-09 PROCEDURE — 71045 X-RAY EXAM CHEST 1 VIEW: CPT

## 2025-05-09 PROCEDURE — 80329 ANALGESICS NON-OPIOID 1 OR 2: CPT

## 2025-05-09 PROCEDURE — 85610 PROTHROMBIN TIME: CPT

## 2025-05-09 PROCEDURE — 85025 COMPLETE CBC W/AUTO DIFF WBC: CPT

## 2025-05-09 PROCEDURE — 80320 DRUG SCREEN QUANTALCOHOLS: CPT

## 2025-05-09 PROCEDURE — 80053 COMPREHEN METABOLIC PANEL: CPT

## 2025-05-09 PROCEDURE — 84484 ASSAY OF TROPONIN QUANT: CPT

## 2025-05-09 PROCEDURE — 81001 URINALYSIS AUTO W/SCOPE: CPT

## 2025-05-09 PROCEDURE — 99285 EMERGENCY DEPT VISIT HI MDM: CPT

## 2025-05-09 PROCEDURE — 36415 COLL VENOUS BLD VENIPUNCTURE: CPT

## 2025-05-09 PROCEDURE — 82140 ASSAY OF AMMONIA: CPT

## 2025-05-09 PROCEDURE — 84443 ASSAY THYROID STIM HORMONE: CPT

## 2025-05-09 PROCEDURE — 80307 DRUG TEST PRSMV CHEM ANLYZR: CPT

## 2025-05-09 PROCEDURE — 93005 ELECTROCARDIOGRAM TRACING: CPT
